# Patient Record
Sex: MALE | Race: WHITE | NOT HISPANIC OR LATINO | Employment: FULL TIME | URBAN - METROPOLITAN AREA
[De-identification: names, ages, dates, MRNs, and addresses within clinical notes are randomized per-mention and may not be internally consistent; named-entity substitution may affect disease eponyms.]

---

## 2022-04-12 ENCOUNTER — HOSPITAL ENCOUNTER (INPATIENT)
Facility: MEDICAL CENTER | Age: 57
LOS: 2 days | DRG: 247 | End: 2022-04-14
Attending: EMERGENCY MEDICINE | Admitting: HOSPITALIST

## 2022-04-12 DIAGNOSIS — I10 HYPERTENSION, UNSPECIFIED TYPE: ICD-10-CM

## 2022-04-12 DIAGNOSIS — I21.19 INFERIOR MYOCARDIAL INFARCTION (HCC): ICD-10-CM

## 2022-04-12 DIAGNOSIS — I21.4 NSTEMI (NON-ST ELEVATED MYOCARDIAL INFARCTION) (HCC): ICD-10-CM

## 2022-04-12 PROBLEM — D72.829 LEUKOCYTOSIS: Status: ACTIVE | Noted: 2022-04-12

## 2022-04-12 PROBLEM — Z72.0 TOBACCO ABUSE: Status: ACTIVE | Noted: 2022-04-12

## 2022-04-12 PROBLEM — F10.10 ALCOHOL ABUSE: Status: ACTIVE | Noted: 2022-04-12

## 2022-04-12 PROBLEM — Z91.199 NON-COMPLIANCE: Status: ACTIVE | Noted: 2022-04-12

## 2022-04-12 PROBLEM — E78.5 DYSLIPIDEMIA: Status: ACTIVE | Noted: 2022-04-12

## 2022-04-12 LAB
ALBUMIN SERPL BCP-MCNC: 3.8 G/DL (ref 3.2–4.9)
ALBUMIN/GLOB SERPL: 1.7 G/DL
ALP SERPL-CCNC: 84 U/L (ref 30–99)
ALT SERPL-CCNC: 27 U/L (ref 2–50)
ANION GAP SERPL CALC-SCNC: 10 MMOL/L (ref 7–16)
APTT PPP: 90.2 SEC (ref 24.7–36)
AST SERPL-CCNC: 44 U/L (ref 12–45)
BASOPHILS # BLD AUTO: 0.2 % (ref 0–1.8)
BASOPHILS # BLD: 0.02 K/UL (ref 0–0.12)
BILIRUB SERPL-MCNC: 0.5 MG/DL (ref 0.1–1.5)
BUN SERPL-MCNC: 12 MG/DL (ref 8–22)
CALCIUM SERPL-MCNC: 7.8 MG/DL (ref 8.5–10.5)
CHLORIDE SERPL-SCNC: 107 MMOL/L (ref 96–112)
CO2 SERPL-SCNC: 22 MMOL/L (ref 20–33)
CREAT SERPL-MCNC: 0.66 MG/DL (ref 0.5–1.4)
EKG IMPRESSION: NORMAL
EOSINOPHIL # BLD AUTO: 0 K/UL (ref 0–0.51)
EOSINOPHIL NFR BLD: 0 % (ref 0–6.9)
ERYTHROCYTE [DISTWIDTH] IN BLOOD BY AUTOMATED COUNT: 38.4 FL (ref 35.9–50)
GFR SERPLBLD CREATININE-BSD FMLA CKD-EPI: 111 ML/MIN/1.73 M 2
GLOBULIN SER CALC-MCNC: 2.3 G/DL (ref 1.9–3.5)
GLUCOSE SERPL-MCNC: 102 MG/DL (ref 65–99)
HCT VFR BLD AUTO: 46 % (ref 42–52)
HGB BLD-MCNC: 16 G/DL (ref 14–18)
IMM GRANULOCYTES # BLD AUTO: 0.06 K/UL (ref 0–0.11)
IMM GRANULOCYTES NFR BLD AUTO: 0.5 % (ref 0–0.9)
INR PPP: 1.05 (ref 0.87–1.13)
LYMPHOCYTES # BLD AUTO: 2.26 K/UL (ref 1–4.8)
LYMPHOCYTES NFR BLD: 19.1 % (ref 22–41)
MCH RBC QN AUTO: 30.1 PG (ref 27–33)
MCHC RBC AUTO-ENTMCNC: 34.8 G/DL (ref 33.7–35.3)
MCV RBC AUTO: 86.6 FL (ref 81.4–97.8)
MONOCYTES # BLD AUTO: 0.46 K/UL (ref 0–0.85)
MONOCYTES NFR BLD AUTO: 3.9 % (ref 0–13.4)
NEUTROPHILS # BLD AUTO: 9.04 K/UL (ref 1.82–7.42)
NEUTROPHILS NFR BLD: 76.3 % (ref 44–72)
NRBC # BLD AUTO: 0 K/UL
NRBC BLD-RTO: 0 /100 WBC
PLATELET # BLD AUTO: 188 K/UL (ref 164–446)
PMV BLD AUTO: 10.8 FL (ref 9–12.9)
POTASSIUM SERPL-SCNC: 4 MMOL/L (ref 3.6–5.5)
PROT SERPL-MCNC: 6.1 G/DL (ref 6–8.2)
PROTHROMBIN TIME: 13.4 SEC (ref 12–14.6)
RBC # BLD AUTO: 5.31 M/UL (ref 4.7–6.1)
SODIUM SERPL-SCNC: 139 MMOL/L (ref 135–145)
TROPONIN T SERPL-MCNC: 100 NG/L (ref 6–19)
UFH PPP CHRO-ACNC: 0.39 IU/ML
WBC # BLD AUTO: 11.8 K/UL (ref 4.8–10.8)

## 2022-04-12 PROCEDURE — 99407 BEHAV CHNG SMOKING > 10 MIN: CPT

## 2022-04-12 PROCEDURE — 99285 EMERGENCY DEPT VISIT HI MDM: CPT

## 2022-04-12 PROCEDURE — 700101 HCHG RX REV CODE 250: Performed by: EMERGENCY MEDICINE

## 2022-04-12 PROCEDURE — 36415 COLL VENOUS BLD VENIPUNCTURE: CPT

## 2022-04-12 PROCEDURE — 96368 THER/DIAG CONCURRENT INF: CPT

## 2022-04-12 PROCEDURE — 96365 THER/PROPH/DIAG IV INF INIT: CPT

## 2022-04-12 PROCEDURE — 700111 HCHG RX REV CODE 636 W/ 250 OVERRIDE (IP): Performed by: INTERNAL MEDICINE

## 2022-04-12 PROCEDURE — 85520 HEPARIN ASSAY: CPT

## 2022-04-12 PROCEDURE — 85025 COMPLETE CBC W/AUTO DIFF WBC: CPT

## 2022-04-12 PROCEDURE — 770020 HCHG ROOM/CARE - TELE (206)

## 2022-04-12 PROCEDURE — 84484 ASSAY OF TROPONIN QUANT: CPT

## 2022-04-12 PROCEDURE — 700102 HCHG RX REV CODE 250 W/ 637 OVERRIDE(OP): Performed by: INTERNAL MEDICINE

## 2022-04-12 PROCEDURE — 96366 THER/PROPH/DIAG IV INF ADDON: CPT

## 2022-04-12 PROCEDURE — 85730 THROMBOPLASTIN TIME PARTIAL: CPT

## 2022-04-12 PROCEDURE — 700111 HCHG RX REV CODE 636 W/ 250 OVERRIDE (IP): Performed by: HOSPITALIST

## 2022-04-12 PROCEDURE — A9270 NON-COVERED ITEM OR SERVICE: HCPCS | Performed by: INTERNAL MEDICINE

## 2022-04-12 PROCEDURE — 85610 PROTHROMBIN TIME: CPT

## 2022-04-12 PROCEDURE — 80053 COMPREHEN METABOLIC PANEL: CPT

## 2022-04-12 PROCEDURE — 93005 ELECTROCARDIOGRAM TRACING: CPT | Performed by: EMERGENCY MEDICINE

## 2022-04-12 PROCEDURE — 99406 BEHAV CHNG SMOKING 3-10 MIN: CPT | Performed by: INTERNAL MEDICINE

## 2022-04-12 PROCEDURE — 96375 TX/PRO/DX INJ NEW DRUG ADDON: CPT

## 2022-04-12 PROCEDURE — 99255 IP/OBS CONSLTJ NEW/EST HI 80: CPT | Mod: 25 | Performed by: INTERNAL MEDICINE

## 2022-04-12 RX ORDER — PROCHLORPERAZINE EDISYLATE 5 MG/ML
5-10 INJECTION INTRAMUSCULAR; INTRAVENOUS EVERY 4 HOURS PRN
Status: DISCONTINUED | OUTPATIENT
Start: 2022-04-12 | End: 2022-04-14 | Stop reason: HOSPADM

## 2022-04-12 RX ORDER — PROMETHAZINE HYDROCHLORIDE 25 MG/1
12.5-25 TABLET ORAL EVERY 4 HOURS PRN
Status: DISCONTINUED | OUTPATIENT
Start: 2022-04-12 | End: 2022-04-14 | Stop reason: HOSPADM

## 2022-04-12 RX ORDER — HEPARIN SODIUM 1000 [USP'U]/ML
2000 INJECTION, SOLUTION INTRAVENOUS; SUBCUTANEOUS PRN
Status: DISCONTINUED | OUTPATIENT
Start: 2022-04-12 | End: 2022-04-13

## 2022-04-12 RX ORDER — ROSUVASTATIN CALCIUM 20 MG/1
20 TABLET, COATED ORAL EVERY EVENING
Status: DISCONTINUED | OUTPATIENT
Start: 2022-04-12 | End: 2022-04-14 | Stop reason: HOSPADM

## 2022-04-12 RX ORDER — NITROGLYCERIN 0.4 MG/1
0.4 TABLET SUBLINGUAL
Status: DISCONTINUED | OUTPATIENT
Start: 2022-04-12 | End: 2022-04-14 | Stop reason: HOSPADM

## 2022-04-12 RX ORDER — HEPARIN SODIUM 1000 [USP'U]/ML
4000 INJECTION, SOLUTION INTRAVENOUS; SUBCUTANEOUS ONCE
Status: DISCONTINUED | OUTPATIENT
Start: 2022-04-12 | End: 2022-04-12

## 2022-04-12 RX ORDER — ASPIRIN 81 MG/1
324 TABLET, CHEWABLE ORAL DAILY
Status: DISCONTINUED | OUTPATIENT
Start: 2022-04-13 | End: 2022-04-13

## 2022-04-12 RX ORDER — POLYETHYLENE GLYCOL 3350 17 G/17G
1 POWDER, FOR SOLUTION ORAL
Status: DISCONTINUED | OUTPATIENT
Start: 2022-04-12 | End: 2022-04-14 | Stop reason: HOSPADM

## 2022-04-12 RX ORDER — HYDRALAZINE HYDROCHLORIDE 20 MG/ML
10 INJECTION INTRAMUSCULAR; INTRAVENOUS EVERY 4 HOURS PRN
Status: DISCONTINUED | OUTPATIENT
Start: 2022-04-12 | End: 2022-04-14 | Stop reason: HOSPADM

## 2022-04-12 RX ORDER — SODIUM CHLORIDE, SODIUM LACTATE, POTASSIUM CHLORIDE, CALCIUM CHLORIDE 600; 310; 30; 20 MG/100ML; MG/100ML; MG/100ML; MG/100ML
INJECTION, SOLUTION INTRAVENOUS CONTINUOUS
Status: DISCONTINUED | OUTPATIENT
Start: 2022-04-12 | End: 2022-04-14

## 2022-04-12 RX ORDER — CARVEDILOL 6.25 MG/1
6.25 TABLET ORAL 2 TIMES DAILY WITH MEALS
Status: DISCONTINUED | OUTPATIENT
Start: 2022-04-12 | End: 2022-04-12

## 2022-04-12 RX ORDER — HEPARIN SODIUM 5000 [USP'U]/100ML
0-30 INJECTION, SOLUTION INTRAVENOUS CONTINUOUS
Status: DISCONTINUED | OUTPATIENT
Start: 2022-04-12 | End: 2022-04-13

## 2022-04-12 RX ORDER — AMOXICILLIN 250 MG
2 CAPSULE ORAL 2 TIMES DAILY
Status: DISCONTINUED | OUTPATIENT
Start: 2022-04-13 | End: 2022-04-14 | Stop reason: HOSPADM

## 2022-04-12 RX ORDER — PROMETHAZINE HYDROCHLORIDE 25 MG/1
12.5-25 SUPPOSITORY RECTAL EVERY 4 HOURS PRN
Status: DISCONTINUED | OUTPATIENT
Start: 2022-04-12 | End: 2022-04-14 | Stop reason: HOSPADM

## 2022-04-12 RX ORDER — ATORVASTATIN CALCIUM 20 MG/1
40 TABLET, FILM COATED ORAL EVERY EVENING
Status: DISCONTINUED | OUTPATIENT
Start: 2022-04-12 | End: 2022-04-12

## 2022-04-12 RX ORDER — ASPIRIN 300 MG/1
300 SUPPOSITORY RECTAL DAILY
Status: DISCONTINUED | OUTPATIENT
Start: 2022-04-13 | End: 2022-04-13

## 2022-04-12 RX ORDER — BISACODYL 10 MG
10 SUPPOSITORY, RECTAL RECTAL
Status: DISCONTINUED | OUTPATIENT
Start: 2022-04-12 | End: 2022-04-14 | Stop reason: HOSPADM

## 2022-04-12 RX ORDER — LOSARTAN POTASSIUM 25 MG/1
25 TABLET ORAL EVERY EVENING
Status: DISCONTINUED | OUTPATIENT
Start: 2022-04-12 | End: 2022-04-14 | Stop reason: HOSPADM

## 2022-04-12 RX ORDER — ONDANSETRON 4 MG/1
4 TABLET, ORALLY DISINTEGRATING ORAL EVERY 4 HOURS PRN
Status: DISCONTINUED | OUTPATIENT
Start: 2022-04-12 | End: 2022-04-14 | Stop reason: HOSPADM

## 2022-04-12 RX ORDER — MORPHINE SULFATE 4 MG/ML
4 INJECTION INTRAVENOUS ONCE
Status: DISCONTINUED | OUTPATIENT
Start: 2022-04-12 | End: 2022-04-13

## 2022-04-12 RX ORDER — ONDANSETRON 2 MG/ML
4 INJECTION INTRAMUSCULAR; INTRAVENOUS ONCE
Status: DISCONTINUED | OUTPATIENT
Start: 2022-04-12 | End: 2022-04-13

## 2022-04-12 RX ORDER — ONDANSETRON 2 MG/ML
4 INJECTION INTRAMUSCULAR; INTRAVENOUS EVERY 4 HOURS PRN
Status: DISCONTINUED | OUTPATIENT
Start: 2022-04-12 | End: 2022-04-14 | Stop reason: HOSPADM

## 2022-04-12 RX ORDER — LABETALOL HYDROCHLORIDE 5 MG/ML
10 INJECTION, SOLUTION INTRAVENOUS ONCE
Status: COMPLETED | OUTPATIENT
Start: 2022-04-12 | End: 2022-04-12

## 2022-04-12 RX ORDER — LISINOPRIL 10 MG/1
5 TABLET ORAL DAILY
Status: DISCONTINUED | OUTPATIENT
Start: 2022-04-13 | End: 2022-04-12

## 2022-04-12 RX ORDER — ACETAMINOPHEN 325 MG/1
650 TABLET ORAL EVERY 6 HOURS PRN
Status: DISCONTINUED | OUTPATIENT
Start: 2022-04-12 | End: 2022-04-14 | Stop reason: HOSPADM

## 2022-04-12 RX ORDER — ASPIRIN 325 MG
325 TABLET ORAL DAILY
Status: DISCONTINUED | OUTPATIENT
Start: 2022-04-13 | End: 2022-04-13

## 2022-04-12 RX ORDER — NITROGLYCERIN 20 MG/100ML
20 INJECTION INTRAVENOUS CONTINUOUS
Status: DISCONTINUED | OUTPATIENT
Start: 2022-04-12 | End: 2022-04-14

## 2022-04-12 RX ORDER — IBUPROFEN 600 MG/1
600 TABLET ORAL PRN
Status: ON HOLD | COMMUNITY
End: 2022-04-14

## 2022-04-12 RX ADMIN — LABETALOL HYDROCHLORIDE 10 MG: 5 INJECTION INTRAVENOUS at 18:26

## 2022-04-12 RX ADMIN — HEPARIN SODIUM 12 UNITS/KG/HR: 5000 INJECTION, SOLUTION INTRAVENOUS at 19:17

## 2022-04-12 RX ADMIN — LOSARTAN POTASSIUM 25 MG: 25 TABLET, FILM COATED ORAL at 19:09

## 2022-04-12 RX ADMIN — ROSUVASTATIN CALCIUM 20 MG: 20 TABLET, FILM COATED ORAL at 19:09

## 2022-04-12 RX ADMIN — ONDANSETRON 4 MG: 2 INJECTION INTRAMUSCULAR; INTRAVENOUS at 20:21

## 2022-04-12 RX ADMIN — NITROGLYCERIN 20 MCG/MIN: 20 INJECTION INTRAVENOUS at 19:13

## 2022-04-12 ASSESSMENT — ENCOUNTER SYMPTOMS
DIZZINESS: 0
MUSCULOSKELETAL NEGATIVE: 1
CONSTIPATION: 0
HEARTBURN: 0
COUGH: 0
DIARRHEA: 0
FOCAL WEAKNESS: 0
NAUSEA: 1
PALPITATIONS: 0
ABDOMINAL PAIN: 0
VOMITING: 0
BLOOD IN STOOL: 0
DOUBLE VISION: 0
CHILLS: 0
BRUISES/BLEEDS EASILY: 0
LOSS OF CONSCIOUSNESS: 0
HEMOPTYSIS: 0
DIAPHORESIS: 1
WHEEZING: 0
SHORTNESS OF BREATH: 1
NERVOUS/ANXIOUS: 0
NEUROLOGICAL NEGATIVE: 1
EYES NEGATIVE: 1
SEIZURES: 0
FEVER: 0
ORTHOPNEA: 1
HEADACHES: 0

## 2022-04-12 ASSESSMENT — LIFESTYLE VARIABLES: SUBSTANCE_ABUSE: 1

## 2022-04-13 ENCOUNTER — APPOINTMENT (OUTPATIENT)
Dept: CARDIOLOGY | Facility: MEDICAL CENTER | Age: 57
DRG: 247 | End: 2022-04-13
Attending: INTERNAL MEDICINE

## 2022-04-13 LAB
ANION GAP SERPL CALC-SCNC: 11 MMOL/L (ref 7–16)
BUN SERPL-MCNC: 14 MG/DL (ref 8–22)
CALCIUM SERPL-MCNC: 9 MG/DL (ref 8.4–10.2)
CHLORIDE SERPL-SCNC: 102 MMOL/L (ref 96–112)
CHOLEST SERPL-MCNC: 164 MG/DL (ref 100–199)
CO2 SERPL-SCNC: 20 MMOL/L (ref 20–33)
CREAT SERPL-MCNC: 0.67 MG/DL (ref 0.5–1.4)
EKG IMPRESSION: NORMAL
ERYTHROCYTE [DISTWIDTH] IN BLOOD BY AUTOMATED COUNT: 38.2 FL (ref 35.9–50)
GFR SERPLBLD CREATININE-BSD FMLA CKD-EPI: 110 ML/MIN/1.73 M 2
GLUCOSE SERPL-MCNC: 113 MG/DL (ref 65–99)
HCT VFR BLD AUTO: 45.6 % (ref 42–52)
HDLC SERPL-MCNC: 39 MG/DL
HGB BLD-MCNC: 15.9 G/DL (ref 14–18)
LDLC SERPL CALC-MCNC: 90 MG/DL
LV EJECT FRACT  99904: 55
LV EJECT FRACT MOD 2C 99903: 55.82
LV EJECT FRACT MOD 4C 99902: 57.24
LV EJECT FRACT MOD BP 99901: 56.11
MAGNESIUM SERPL-MCNC: 2 MG/DL (ref 1.5–2.5)
MCH RBC QN AUTO: 30 PG (ref 27–33)
MCHC RBC AUTO-ENTMCNC: 34.9 G/DL (ref 33.7–35.3)
MCV RBC AUTO: 86 FL (ref 81.4–97.8)
PLATELET # BLD AUTO: 195 K/UL (ref 164–446)
PMV BLD AUTO: 10.7 FL (ref 9–12.9)
POTASSIUM SERPL-SCNC: 4.5 MMOL/L (ref 3.6–5.5)
RBC # BLD AUTO: 5.3 M/UL (ref 4.7–6.1)
SODIUM SERPL-SCNC: 133 MMOL/L (ref 135–145)
TRIGL SERPL-MCNC: 175 MG/DL (ref 0–149)
TROPONIN T SERPL-MCNC: 425 NG/L (ref 6–19)
TROPONIN T SERPL-MCNC: 902 NG/L (ref 6–19)
TSH SERPL DL<=0.005 MIU/L-ACNC: 1.09 UIU/ML (ref 0.38–5.33)
UFH PPP CHRO-ACNC: 0.15 IU/ML
UFH PPP CHRO-ACNC: 0.41 IU/ML
UFH PPP CHRO-ACNC: 0.7 IU/ML
WBC # BLD AUTO: 12 K/UL (ref 4.8–10.8)

## 2022-04-13 PROCEDURE — 770020 HCHG ROOM/CARE - TELE (206)

## 2022-04-13 PROCEDURE — A9270 NON-COVERED ITEM OR SERVICE: HCPCS

## 2022-04-13 PROCEDURE — 4A023N7 MEASUREMENT OF CARDIAC SAMPLING AND PRESSURE, LEFT HEART, PERCUTANEOUS APPROACH: ICD-10-PCS | Performed by: INTERNAL MEDICINE

## 2022-04-13 PROCEDURE — 027035Z DILATION OF CORONARY ARTERY, ONE ARTERY WITH TWO DRUG-ELUTING INTRALUMINAL DEVICES, PERCUTANEOUS APPROACH: ICD-10-PCS | Performed by: INTERNAL MEDICINE

## 2022-04-13 PROCEDURE — 700111 HCHG RX REV CODE 636 W/ 250 OVERRIDE (IP): Performed by: INTERNAL MEDICINE

## 2022-04-13 PROCEDURE — 84484 ASSAY OF TROPONIN QUANT: CPT

## 2022-04-13 PROCEDURE — 93010 ELECTROCARDIOGRAM REPORT: CPT | Performed by: INTERNAL MEDICINE

## 2022-04-13 PROCEDURE — 85520 HEPARIN ASSAY: CPT | Mod: 91

## 2022-04-13 PROCEDURE — 80061 LIPID PANEL: CPT

## 2022-04-13 PROCEDURE — B2111ZZ FLUOROSCOPY OF MULTIPLE CORONARY ARTERIES USING LOW OSMOLAR CONTRAST: ICD-10-PCS | Performed by: INTERNAL MEDICINE

## 2022-04-13 PROCEDURE — B240ZZ3 ULTRASONOGRAPHY OF SINGLE CORONARY ARTERY, INTRAVASCULAR: ICD-10-PCS | Performed by: INTERNAL MEDICINE

## 2022-04-13 PROCEDURE — 99406 BEHAV CHNG SMOKING 3-10 MIN: CPT | Performed by: INTERNAL MEDICINE

## 2022-04-13 PROCEDURE — 93306 TTE W/DOPPLER COMPLETE: CPT

## 2022-04-13 PROCEDURE — 99233 SBSQ HOSP IP/OBS HIGH 50: CPT | Mod: GC | Performed by: HOSPITALIST

## 2022-04-13 PROCEDURE — 80048 BASIC METABOLIC PNL TOTAL CA: CPT

## 2022-04-13 PROCEDURE — 96366 THER/PROPH/DIAG IV INF ADDON: CPT

## 2022-04-13 PROCEDURE — 99152 MOD SED SAME PHYS/QHP 5/>YRS: CPT | Performed by: INTERNAL MEDICINE

## 2022-04-13 PROCEDURE — 700102 HCHG RX REV CODE 250 W/ 637 OVERRIDE(OP): Performed by: HOSPITALIST

## 2022-04-13 PROCEDURE — 93005 ELECTROCARDIOGRAM TRACING: CPT | Performed by: HOSPITALIST

## 2022-04-13 PROCEDURE — 84443 ASSAY THYROID STIM HORMONE: CPT

## 2022-04-13 PROCEDURE — 92928 PRQ TCAT PLMT NTRAC ST 1 LES: CPT | Mod: RC | Performed by: INTERNAL MEDICINE

## 2022-04-13 PROCEDURE — 36415 COLL VENOUS BLD VENIPUNCTURE: CPT

## 2022-04-13 PROCEDURE — 93005 ELECTROCARDIOGRAM TRACING: CPT | Performed by: INTERNAL MEDICINE

## 2022-04-13 PROCEDURE — 700111 HCHG RX REV CODE 636 W/ 250 OVERRIDE (IP)

## 2022-04-13 PROCEDURE — A9270 NON-COVERED ITEM OR SERVICE: HCPCS | Performed by: HOSPITALIST

## 2022-04-13 PROCEDURE — 83735 ASSAY OF MAGNESIUM: CPT

## 2022-04-13 PROCEDURE — 93458 L HRT ARTERY/VENTRICLE ANGIO: CPT | Mod: 26,59 | Performed by: INTERNAL MEDICINE

## 2022-04-13 PROCEDURE — 93306 TTE W/DOPPLER COMPLETE: CPT | Mod: 26 | Performed by: INTERNAL MEDICINE

## 2022-04-13 PROCEDURE — 85027 COMPLETE CBC AUTOMATED: CPT

## 2022-04-13 PROCEDURE — 85347 COAGULATION TIME ACTIVATED: CPT | Mod: 91

## 2022-04-13 PROCEDURE — 700102 HCHG RX REV CODE 250 W/ 637 OVERRIDE(OP)

## 2022-04-13 PROCEDURE — 99232 SBSQ HOSP IP/OBS MODERATE 35: CPT | Mod: 25,FS | Performed by: INTERNAL MEDICINE

## 2022-04-13 PROCEDURE — 700105 HCHG RX REV CODE 258: Performed by: HOSPITALIST

## 2022-04-13 PROCEDURE — C1769 GUIDE WIRE: HCPCS

## 2022-04-13 PROCEDURE — 700101 HCHG RX REV CODE 250

## 2022-04-13 PROCEDURE — 92978 ENDOLUMINL IVUS OCT C 1ST: CPT | Mod: 26,RC | Performed by: INTERNAL MEDICINE

## 2022-04-13 PROCEDURE — 93010 ELECTROCARDIOGRAM REPORT: CPT | Mod: 77 | Performed by: INTERNAL MEDICINE

## 2022-04-13 RX ORDER — EPTIFIBATIDE 0.75 MG/ML
2 INJECTION, SOLUTION INTRAVENOUS CONTINUOUS
Status: ACTIVE | OUTPATIENT
Start: 2022-04-13 | End: 2022-04-14

## 2022-04-13 RX ORDER — PRASUGREL 10 MG/1
60 TABLET, FILM COATED ORAL ONCE
Status: DISCONTINUED | OUTPATIENT
Start: 2022-04-13 | End: 2022-04-13

## 2022-04-13 RX ORDER — PRASUGREL 10 MG/1
TABLET, FILM COATED ORAL
Status: COMPLETED
Start: 2022-04-13 | End: 2022-04-13

## 2022-04-13 RX ORDER — EPTIFIBATIDE 2 MG/ML
INJECTION, SOLUTION INTRAVENOUS
Status: COMPLETED
Start: 2022-04-13 | End: 2022-04-13

## 2022-04-13 RX ORDER — VERAPAMIL HYDROCHLORIDE 2.5 MG/ML
INJECTION, SOLUTION INTRAVENOUS
Status: COMPLETED
Start: 2022-04-13 | End: 2022-04-13

## 2022-04-13 RX ORDER — PRASUGREL 10 MG/1
10 TABLET, FILM COATED ORAL DAILY
Status: DISCONTINUED | OUTPATIENT
Start: 2022-04-14 | End: 2022-04-14 | Stop reason: HOSPADM

## 2022-04-13 RX ORDER — CHOLECALCIFEROL (VITAMIN D3) 125 MCG
5 CAPSULE ORAL NIGHTLY
Status: DISCONTINUED | OUTPATIENT
Start: 2022-04-13 | End: 2022-04-14 | Stop reason: HOSPADM

## 2022-04-13 RX ORDER — EPTIFIBATIDE 0.75 MG/ML
INJECTION, SOLUTION INTRAVENOUS
Status: COMPLETED
Start: 2022-04-13 | End: 2022-04-14

## 2022-04-13 RX ORDER — HEPARIN SODIUM 200 [USP'U]/100ML
INJECTION, SOLUTION INTRAVENOUS
Status: COMPLETED
Start: 2022-04-13 | End: 2022-04-13

## 2022-04-13 RX ORDER — LIDOCAINE HYDROCHLORIDE 20 MG/ML
INJECTION, SOLUTION INFILTRATION; PERINEURAL
Status: COMPLETED
Start: 2022-04-13 | End: 2022-04-13

## 2022-04-13 RX ORDER — HEPARIN SODIUM 1000 [USP'U]/ML
INJECTION, SOLUTION INTRAVENOUS; SUBCUTANEOUS
Status: COMPLETED
Start: 2022-04-13 | End: 2022-04-13

## 2022-04-13 RX ORDER — MIDAZOLAM HYDROCHLORIDE 1 MG/ML
INJECTION INTRAMUSCULAR; INTRAVENOUS
Status: COMPLETED
Start: 2022-04-13 | End: 2022-04-13

## 2022-04-13 RX ADMIN — SODIUM CHLORIDE, POTASSIUM CHLORIDE, SODIUM LACTATE AND CALCIUM CHLORIDE: 600; 310; 30; 20 INJECTION, SOLUTION INTRAVENOUS at 02:15

## 2022-04-13 RX ADMIN — PRASUGREL 60 MG: 10 TABLET, FILM COATED ORAL at 19:32

## 2022-04-13 RX ADMIN — MIDAZOLAM HYDROCHLORIDE 2 MG: 1 INJECTION, SOLUTION INTRAMUSCULAR; INTRAVENOUS at 19:24

## 2022-04-13 RX ADMIN — ACETAMINOPHEN 650 MG: 325 TABLET, FILM COATED ORAL at 04:43

## 2022-04-13 RX ADMIN — EPTIFIBATIDE 14.4 MG: 20 INJECTION INTRAVENOUS at 19:20

## 2022-04-13 RX ADMIN — Medication 5 MG: at 01:45

## 2022-04-13 RX ADMIN — HEPARIN SODIUM: 1000 INJECTION, SOLUTION INTRAVENOUS; SUBCUTANEOUS at 18:49

## 2022-04-13 RX ADMIN — Medication 5 MG: at 21:54

## 2022-04-13 RX ADMIN — ASPIRIN 325 MG: 325 TABLET ORAL at 05:54

## 2022-04-13 RX ADMIN — ACETAMINOPHEN 650 MG: 325 TABLET, FILM COATED ORAL at 21:54

## 2022-04-13 RX ADMIN — NITROGLYCERIN 10 ML: 20 INJECTION INTRAVENOUS at 18:51

## 2022-04-13 RX ADMIN — NITROGLYCERIN 0.4 MG: 0.4 TABLET, ORALLY DISINTEGRATING SUBLINGUAL at 04:43

## 2022-04-13 RX ADMIN — EPTIFIBATIDE 14.4 MG: 20 INJECTION INTRAVENOUS at 19:33

## 2022-04-13 RX ADMIN — HEPARIN SODIUM 2000 UNITS: 200 INJECTION, SOLUTION INTRAVENOUS at 18:48

## 2022-04-13 RX ADMIN — SODIUM CHLORIDE, POTASSIUM CHLORIDE, SODIUM LACTATE AND CALCIUM CHLORIDE: 600; 310; 30; 20 INJECTION, SOLUTION INTRAVENOUS at 15:08

## 2022-04-13 RX ADMIN — HEPARIN SODIUM 2000 UNITS: 1000 INJECTION, SOLUTION INTRAVENOUS; SUBCUTANEOUS at 08:31

## 2022-04-13 RX ADMIN — EPTIFIBATIDE 2 MCG/KG/MIN: 0.75 INJECTION, SOLUTION INTRAVENOUS at 19:25

## 2022-04-13 RX ADMIN — NITROGLYCERIN 0.4 MG: 0.4 TABLET, ORALLY DISINTEGRATING SUBLINGUAL at 16:52

## 2022-04-13 RX ADMIN — NITROGLYCERIN 0.4 MG: 0.4 TABLET, ORALLY DISINTEGRATING SUBLINGUAL at 17:03

## 2022-04-13 RX ADMIN — EPTIFIBATIDE 2 MCG/KG/MIN: 0.75 INJECTION INTRAVENOUS at 19:26

## 2022-04-13 RX ADMIN — FENTANYL CITRATE 75 MCG: 50 INJECTION, SOLUTION INTRAMUSCULAR; INTRAVENOUS at 19:24

## 2022-04-13 RX ADMIN — VERAPAMIL HYDROCHLORIDE 2.5 MG: 2.5 INJECTION, SOLUTION INTRAVENOUS at 18:49

## 2022-04-13 RX ADMIN — LIDOCAINE HYDROCHLORIDE: 20 INJECTION, SOLUTION INFILTRATION; PERINEURAL at 18:49

## 2022-04-13 ASSESSMENT — PATIENT HEALTH QUESTIONNAIRE - PHQ9
SUM OF ALL RESPONSES TO PHQ9 QUESTIONS 1 AND 2: 1
9. THOUGHTS THAT YOU WOULD BE BETTER OFF DEAD, OR OF HURTING YOURSELF: NOT AT ALL
5. POOR APPETITE OR OVEREATING: NOT AT ALL
8. MOVING OR SPEAKING SO SLOWLY THAT OTHER PEOPLE COULD HAVE NOTICED. OR THE OPPOSITE, BEING SO FIGETY OR RESTLESS THAT YOU HAVE BEEN MOVING AROUND A LOT MORE THAN USUAL: SEVERAL DAYS
6. FEELING BAD ABOUT YOURSELF - OR THAT YOU ARE A FAILURE OR HAVE LET YOURSELF OR YOUR FAMILY DOWN: NOT AL ALL
7. TROUBLE CONCENTRATING ON THINGS, SUCH AS READING THE NEWSPAPER OR WATCHING TELEVISION: NOT AT ALL
3. TROUBLE FALLING OR STAYING ASLEEP OR SLEEPING TOO MUCH: SEVERAL DAYS
1. LITTLE INTEREST OR PLEASURE IN DOING THINGS: NOT AT ALL
4. FEELING TIRED OR HAVING LITTLE ENERGY: SEVERAL DAYS
SUM OF ALL RESPONSES TO PHQ QUESTIONS 1-9: 4
2. FEELING DOWN, DEPRESSED, IRRITABLE, OR HOPELESS: SEVERAL DAYS

## 2022-04-13 ASSESSMENT — ENCOUNTER SYMPTOMS
WEIGHT LOSS: 0
WHEEZING: 0
EYE PAIN: 0
EYE REDNESS: 0
EYE DISCHARGE: 0
AGITATION: 0
COUGH: 0
SPUTUM PRODUCTION: 0
VOMITING: 0
SORE THROAT: 0
DIZZINESS: 0
STRIDOR: 0
ABDOMINAL DISTENTION: 0
WEAKNESS: 0
CHILLS: 0
CONSTIPATION: 0
SINUS PAIN: 0
TROUBLE SWALLOWING: 0
DIAPHORESIS: 0
SHORTNESS OF BREATH: 0
NUMBNESS: 0
ABDOMINAL PAIN: 0
FOCAL WEAKNESS: 0
COLOR CHANGE: 0
NAUSEA: 0
CHEST TIGHTNESS: 1
PND: 0
BLOOD IN STOOL: 0
CONFUSION: 0
ORTHOPNEA: 0
PALPITATIONS: 0
FEVER: 0
NERVOUS/ANXIOUS: 0
SPEECH CHANGE: 0
HEMOPTYSIS: 0

## 2022-04-13 ASSESSMENT — LIFESTYLE VARIABLES
HOW MANY TIMES IN THE PAST YEAR HAVE YOU HAD 5 OR MORE DRINKS IN A DAY: 20
EVER HAD A DRINK FIRST THING IN THE MORNING TO STEADY YOUR NERVES TO GET RID OF A HANGOVER: NO
TOTAL SCORE: 2
HAVE PEOPLE ANNOYED YOU BY CRITICIZING YOUR DRINKING: NO
AVERAGE NUMBER OF DAYS PER WEEK YOU HAVE A DRINK CONTAINING ALCOHOL: 3
TOTAL SCORE: 2
HAVE YOU EVER FELT YOU SHOULD CUT DOWN ON YOUR DRINKING: YES
DOES PATIENT WANT TO STOP DRINKING: YES
CONSUMPTION TOTAL: POSITIVE
EVER FELT BAD OR GUILTY ABOUT YOUR DRINKING: YES
ALCOHOL_USE: YES
TOTAL SCORE: 2
DOES PATIENT WANT TO TALK TO SOMEONE ABOUT QUITTING: NO
ON A TYPICAL DAY WHEN YOU DRINK ALCOHOL HOW MANY DRINKS DO YOU HAVE: 2

## 2022-04-13 ASSESSMENT — COGNITIVE AND FUNCTIONAL STATUS - GENERAL
SUGGESTED CMS G CODE MODIFIER DAILY ACTIVITY: CH
MOBILITY SCORE: 24
SUGGESTED CMS G CODE MODIFIER MOBILITY: CH
DAILY ACTIVITIY SCORE: 24

## 2022-04-13 ASSESSMENT — PAIN DESCRIPTION - PAIN TYPE
TYPE: ACUTE PAIN

## 2022-04-13 ASSESSMENT — FIBROSIS 4 INDEX: FIB4 SCORE: 2.43

## 2022-04-13 NOTE — DISCHARGE PLANNING
Anticipated Discharge Disposition: home w/close outpt follow-up     Action: pt pending medical clearance, pt currently on 0 liters O2, 6 clicks are 24/24. Orientation: A&O 4.      Barriers to Discharge: medical clearance. No insurance listed at the moment. Pt lives in [place], [place]    Plan: f/u with pt and medical team to discuss dc needs and barriers.

## 2022-04-13 NOTE — ED NOTES
"Pt provided container for his teeth, labeled with pt sticker. Pt requesting lights be turned down do he can rest. Current pain level is 2/10 and \"tolerable\".   "

## 2022-04-13 NOTE — ASSESSMENT & PLAN NOTE
Likely NSTEMI due to elevated troponin and no ST elevation in EKG.  Patient started having chest pain after running after his dog on the street.  Patient has been noncompliant with medications since 2015 after he had stent placement.  He only took medication for 6 months at that time.  -Telemetry  -Continue aspirin  -Continue Metroprolol tartrate 25 mg twice daily  -Continue Heparin therapeutic dose  -Continue nitroglycerin  -Follow-up with cardiology on cardiac catheterization results.

## 2022-04-13 NOTE — PROGRESS NOTES
0300 MD notified of increased chest pain to 3/10. Per MD continue with nitro drip and do not give PRN sublingual nitro. MD also updated of sys 115 which is approaching the current parameters of the drip going. Will continue to monitor.      0400 Pt sys dropped below 115. Clarified order with MD. Per MD continue with the nitro drip. Pt reporting 3/10 pain still but wincing and reporting that he feels like his chest is about to burst. Per MD gave sublingual nitro tab.

## 2022-04-13 NOTE — ASSESSMENT & PLAN NOTE
History of tobacco use disorder.  Smokes 1 pack per 2 days.  -Advised patient on quitting smoking due to his coronary artery disease.

## 2022-04-13 NOTE — ASSESSMENT & PLAN NOTE
History of alcohol use disorder.  Alcohol: 2 beers per 3 days  -Advised patient to quit drinking due to his high risk for coronary artery disease.

## 2022-04-13 NOTE — ASSESSMENT & PLAN NOTE
Likely reactive leukocytosis due to pain, versus low suspicion of infection.  -Continue to monitor CBCs

## 2022-04-13 NOTE — H&P
Hospital Medicine History & Physical Note    Date of Service  4/12/2022    Primary Care Physician  Pcp Pt States None    Consultants  cardiology    Specialist Names: Dr. Bell    Code Status  Full Code    Chief Complaint  Chief Complaint   Patient presents with   • Chest Pain       History of Presenting Illness  Joe Corrales is a 56 y.o. male who presented 4/12/2022 with ongoing chest pain that began around 11 AM today.  Patient has a past medical history of coronary artery disease with myocardial infarction 7 years ago.  He took his medications after having a stent placed 7 years ago for about 6 months and then stopped as he did not feel good with them.  He has no other explanation for being noncompliant.  Patient been ongoing with tobacco abuse and alcohol abuse and marijuana use has now developed worsening chest pain and this is accompanied by diaphoresis as well as shortness of breath and radiation to the left arm.  The patient's initial pain was 5-6 out of 10 intensity and then slowly intensified and so he went to the local emergency room in [place].  From [place] he was life flighted to [facility] for evaluation.  The evaluation at this point shows a non-ST elevation myocardial infarction and thus cardiology at this point plans for cardiac catheterization tomorrow morning..    I discussed the plan of care with patient, bedside RN, pharmacy and Emergency room physician and cardiology.    Review of Systems  Review of Systems   Constitutional: Positive for diaphoresis and malaise/fatigue. Negative for chills and fever.   HENT: Negative.    Eyes: Negative.  Negative for double vision.   Respiratory: Positive for shortness of breath. Negative for cough, hemoptysis and wheezing.    Cardiovascular: Positive for chest pain and orthopnea. Negative for palpitations and leg swelling.   Gastrointestinal: Positive for nausea. Negative for abdominal pain, blood in stool, constipation, diarrhea, heartburn and vomiting.    Genitourinary: Negative.  Negative for frequency, hematuria and urgency.   Musculoskeletal: Negative.  Negative for joint pain.   Skin: Negative.  Negative for itching and rash.   Neurological: Negative.  Negative for dizziness, focal weakness, seizures, loss of consciousness and headaches.   Endo/Heme/Allergies: Negative.  Does not bruise/bleed easily.   Psychiatric/Behavioral: Positive for substance abuse (Tobacco, alcohol, marijuana). Negative for suicidal ideas. The patient is not nervous/anxious.    All other systems reviewed and are negative.      Past Medical History   has no past medical history on file.    Surgical History   has no past surgical history on file.     Family History  family history is not on file.   Family history reviewed with patient. There is family history that is pertinent to the chief complaint.     Social History   reports that he has been smoking cigarettes. He has been smoking about 0.50 packs per day. He has never used smokeless tobacco. He reports current alcohol use of about 1.2 oz of alcohol per week. He reports current drug use. Drug: Inhaled.    Allergies  No Known Allergies    Medications  Prior to Admission Medications   Prescriptions Last Dose Informant Patient Reported? Taking?   aspirin (ASA) 81 MG CHEW chewable tablet   No No   Sig: Take 1 Tab by mouth every day.   atorvastatin (LIPITOR) 40 MG TABS   No No   Sig: Take 1 Tab by mouth every evening.   lisinopril (PRINIVIL) 5 MG TABS   No No   Sig: Take 1 Tab by mouth every day.   metoprolol (LOPRESSOR) 25 MG TABS   No No   Sig: Take 1 Tab by mouth 2 Times a Day.   prasugrel (EFFIENT) 10 MG Tab   No No   Sig: Take 1 Tab by mouth every day.      Facility-Administered Medications: None       Physical Exam  Temp:  [36.5 °C (97.7 °F)] 36.5 °C (97.7 °F)  Pulse:  [50-59] 54  Resp:  [16-22] 19  BP: (150-178)/() 150/97  SpO2:  [94 %-97 %] 96 %  Blood Pressure: 150/97   Temperature: 36.5 °C (97.7 °F)   Pulse: (!) 54    Respiration: 19   Pulse Oximetry: 96 %       Physical Exam  Vitals and nursing note reviewed. Exam conducted with a chaperone present.   Constitutional:       General: He is not in acute distress.     Appearance: Normal appearance. He is well-developed and normal weight. He is not ill-appearing, toxic-appearing or diaphoretic.   HENT:      Head: Normocephalic and atraumatic.      Right Ear: External ear normal.      Left Ear: External ear normal.      Nose: Nose normal.      Mouth/Throat:      Mouth: Mucous membranes are moist.      Pharynx: Oropharynx is clear.   Eyes:      Extraocular Movements: Extraocular movements intact.      Conjunctiva/sclera: Conjunctivae normal.      Pupils: Pupils are equal, round, and reactive to light.   Neck:      Thyroid: No thyromegaly.      Vascular: No JVD.   Cardiovascular:      Rate and Rhythm: Normal rate and regular rhythm.      Pulses: Normal pulses.      Heart sounds: Normal heart sounds.   Pulmonary:      Effort: Pulmonary effort is normal.      Breath sounds: Normal breath sounds.   Chest:      Chest wall: No tenderness.   Abdominal:      General: Abdomen is flat. Bowel sounds are normal. There is no distension.      Palpations: Abdomen is soft. There is no mass.      Tenderness: There is no abdominal tenderness. There is no guarding or rebound.   Musculoskeletal:         General: Normal range of motion.      Cervical back: Normal range of motion and neck supple.      Right lower leg: No edema.   Lymphadenopathy:      Cervical: No cervical adenopathy.   Skin:     General: Skin is warm and dry.      Capillary Refill: Capillary refill takes less than 2 seconds.      Findings: No rash.   Neurological:      General: No focal deficit present.      Mental Status: He is alert and oriented to person, place, and time. Mental status is at baseline.      GCS: GCS eye subscore is 4. GCS verbal subscore is 5. GCS motor subscore is 6.      Cranial Nerves: No cranial nerve deficit.       Motor: No weakness.      Gait: Gait normal.      Deep Tendon Reflexes: Reflexes are normal and symmetric.   Psychiatric:         Mood and Affect: Mood normal.         Behavior: Behavior normal.         Thought Content: Thought content normal.         Judgment: Judgment normal.         Laboratory:  Recent Labs     04/12/22  1734   WBC 11.8*   RBC 5.31   HEMOGLOBIN 16.0   HEMATOCRIT 46.0   MCV 86.6   MCH 30.1   MCHC 34.8   RDW 38.4   PLATELETCT 188   MPV 10.8     Recent Labs     04/12/22  1734   SODIUM 139   POTASSIUM 4.0   CHLORIDE 107   CO2 22   GLUCOSE 102*   BUN 12   CREATININE 0.66   CALCIUM 7.8*     Recent Labs     04/12/22  1734   ALTSGPT 27   ASTSGOT 44   ALKPHOSPHAT 84   TBILIRUBIN 0.5   GLUCOSE 102*     Recent Labs     04/12/22  1734   APTT 90.2*   INR 1.05     No results for input(s): NTPROBNP in the last 72 hours.      Recent Labs     04/12/22  1734   TROPONINT 100*       Imaging:  EC-ECHOCARDIOGRAM COMPLETE W/O CONT    (Results Pending)       X-Ray:  I have personally reviewed the images and compared with prior images.  EKG:  I have personally reviewed the images and compared with prior images.    Assessment/Plan:  I anticipate this patient will require at least two midnights for appropriate medical management, necessitating inpatient admission.    * NSTEMI (non-ST elevated myocardial infarction) (HCC)- (present on admission)  Assessment & Plan  Patient states that suddenly around 11 AM today he developed sudden onset of chest pressure which became 5 out of 6 in intensity.  He says it felt like somebody hit him with a baseball bat.  Became diaphoretic short of breath and it radiated into the left shoulder and down the left arm.  He also had diaphoresis  He also had nausea but no vomiting  Went to the local emergency room and [place].  He was identified to have a non-ST elevation myocardial infarction was life flighted to [facility] for additional treatment  Cardiology has seen the patient and they recommend at  this point cardiac catheterization in the morning and stabilization overnight with medication optimization.    Leukocytosis  Assessment & Plan  Mild leukocytosis which is most likely stress response continue to monitor    Non-compliance  Assessment & Plan  Discussed the necessity for medications after his myocardial infarction.  Patient had a myocardial infarction 7 years ago and has been noncompliant with his medications.    Alcohol abuse  Assessment & Plan  Alcohol cessation counseling provided    Tobacco abuse  Assessment & Plan  -nicotine replacement protocol and cessation education provided for 12 minutes, discussed options of nicotine patch, acupuncture, medical treatment with wellbutrin and chantix. Discussed other options. Code 71141        VTE prophylaxis: therapeutic anticoagulation with Heparin infusion

## 2022-04-13 NOTE — CONSULTS
Reason for Consult:  Asked by Dr Jim Topete M.D. to see this patient with chest pain    CC:   Chief Complaint   Patient presents with   • Chest Pain       HPI:      56 year old man with PMH CAD / MI s/p PCI LCx 2015 following thrombolytic therapy, HTN, HLD, active tobacco presents with recurrent chest pain this morning about 11am. 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 Patient ran out after him. Soon after developed chest pain. Found ACS on arrival and treated with aspirin, heparin bolus, nitro, and morphine. Transferred for further management. Patient admits that after his last MI dropped his medications after about 6 months due to adverse effects and feeling ill. Currently smoking tobacco. Tried to stop for 40 days but resumed when was seeing friends. Father with coronary disease leading to MI in 60s.     Medications / Drug list prior to admission:  No current facility-administered medications on file prior to encounter.     Current Outpatient Medications on File Prior to Encounter   Medication Sig Dispense Refill   • prasugrel (EFFIENT) 10 MG Tab Take 1 Tab by mouth every day. 90 Tab 3   • atorvastatin (LIPITOR) 40 MG TABS Take 1 Tab by mouth every evening. 30 Tab 11   • lisinopril (PRINIVIL) 5 MG TABS Take 1 Tab by mouth every day. 30 Tab 11   • metoprolol (LOPRESSOR) 25 MG TABS Take 1 Tab by mouth 2 Times a Day. 60 Tab 11   • aspirin (ASA) 81 MG CHEW chewable tablet Take 1 Tab by mouth every day. 100 Tab 11       Current list of administered Medications:    Current Facility-Administered Medications:   •  morphine 4 MG/ML injection 4 mg, 4 mg, Intravenous, Once, Jim Topete M.D.  •  ondansetron (ZOFRAN) syringe/vial injection 4 mg, 4 mg, Intravenous, Once, Jim Topete M.D.  •  heparin infusion 25,000 units in 500 mL 0.45% NACL, 0-30 Units/kg/hr, Intravenous, Continuous, Jared Rajan M.D.  •  heparin injection 4,000 Units, 4,000 Units,  Intravenous, Once, Jared Rajan M.D.  •  heparin injection 2,000 Units, 2,000 Units, Intravenous, PRN, Jared Rajan M.D.  •  carvedilol (COREG) tablet 6.25 mg, 6.25 mg, Oral, BID WITH MEALS, aJred Rajan M.D.  •  rosuvastatin (CRESTOR) tablet 20 mg, 20 mg, Oral, Q EVENING, aJred Rajan M.D.    Current Outpatient Medications:   •  prasugrel (EFFIENT) 10 MG Tab, Take 1 Tab by mouth every day., Disp: 90 Tab, Rfl: 3  •  atorvastatin (LIPITOR) 40 MG TABS, Take 1 Tab by mouth every evening., Disp: 30 Tab, Rfl: 11  •  lisinopril (PRINIVIL) 5 MG TABS, Take 1 Tab by mouth every day., Disp: 30 Tab, Rfl: 11  •  metoprolol (LOPRESSOR) 25 MG TABS, Take 1 Tab by mouth 2 Times a Day., Disp: 60 Tab, Rfl: 11  •  aspirin (ASA) 81 MG CHEW chewable tablet, Take 1 Tab by mouth every day., Disp: 100 Tab, Rfl: 11    History reviewed. No pertinent past medical history.    History reviewed. No pertinent surgical history.    History reviewed. No pertinent family history.  Patient family history was personally reviewed, no pertinent family history to current presentation    Social History     Socioeconomic History   • Marital status: Single     Spouse name: Not on file   • Number of children: Not on file   • Years of education: Not on file   • Highest education level: Not on file   Occupational History   • Not on file   Tobacco Use   • Smoking status: Current Every Day Smoker     Packs/day: 0.50     Types: Cigarettes   • Smokeless tobacco: Never Used   Vaping Use   • Vaping Use: Never used   Substance and Sexual Activity   • Alcohol use: Yes     Alcohol/week: 1.2 oz     Types: 2 Cans of beer per week     Comment: occ   • Drug use: Yes     Types: Inhaled     Comment: marijuana   • Sexual activity: Not on file   Other Topics Concern   • Not on file   Social History Narrative   • Not on file     Social Determinants of Health     Financial Resource Strain: Not on file   Food Insecurity: Not on file   Transportation Needs: Not on file  "  Physical Activity: Not on file   Stress: Not on file   Social Connections: Not on file   Intimate Partner Violence: Not on file   Housing Stability: Not on file       ALLERGIES:  No Known Allergies    Review of systems:  A complete review of symptoms was reviewed with patient. This is reviewed in H&P and PMH. ALL OTHERS reviewed and negative    Physical exam:  Patient Vitals for the past 24 hrs:   BP Temp Temp src Pulse Resp SpO2 Height Weight   04/12/22 1826 (!) 178/118 -- -- -- -- -- -- --   04/12/22 1743 (!) 166/99 -- -- (!) 58 (!) 22 97 % -- --   04/12/22 1734 (!) 178/108 36.5 °C (97.7 °F) Temporal (!) 59 16 94 % -- --   04/12/22 1733 -- -- -- -- -- -- 1.702 m (5' 7\") 77.1 kg (170 lb)     General: No acute distress.   EYES: no jaundice  HEENT: OP clear   Neck: No bruits No JVD.   CVS:  RRR. S1 + S2. No M/R/G. No edema.  Resp: CTAB. No wheezing or crackles/rhonchi.  Abdomen: Soft, NT, ND,  Skin: Grossly nothing acute no obvious rashes  Neurological: Alert, Moves all extremities, no cranial nerve defects on limited exam  Extremities: Pulse 2+ in b/l LE. No cyanosis.     Data:  Laboratory studies personally reviewed by me:  Recent Results (from the past 24 hour(s))   CBC WITH DIFFERENTIAL    Collection Time: 04/12/22  5:34 PM   Result Value Ref Range    WBC 11.8 (H) 4.8 - 10.8 K/uL    RBC 5.31 4.70 - 6.10 M/uL    Hemoglobin 16.0 14.0 - 18.0 g/dL    Hematocrit 46.0 42.0 - 52.0 %    MCV 86.6 81.4 - 97.8 fL    MCH 30.1 27.0 - 33.0 pg    MCHC 34.8 33.7 - 35.3 g/dL    RDW 38.4 35.9 - 50.0 fL    Platelet Count 188 164 - 446 K/uL    MPV 10.8 9.0 - 12.9 fL    Neutrophils-Polys 76.30 (H) 44.00 - 72.00 %    Lymphocytes 19.10 (L) 22.00 - 41.00 %    Monocytes 3.90 0.00 - 13.40 %    Eosinophils 0.00 0.00 - 6.90 %    Basophils 0.20 0.00 - 1.80 %    Immature Granulocytes 0.50 0.00 - 0.90 %    Nucleated RBC 0.00 /100 WBC    Neutrophils (Absolute) 9.04 (H) 1.82 - 7.42 K/uL    Lymphs (Absolute) 2.26 1.00 - 4.80 K/uL    Monos " (Absolute) 0.46 0.00 - 0.85 K/uL    Eos (Absolute) 0.00 0.00 - 0.51 K/uL    Baso (Absolute) 0.02 0.00 - 0.12 K/uL    Immature Granulocytes (abs) 0.06 0.00 - 0.11 K/uL    NRBC (Absolute) 0.00 K/uL   COMP METABOLIC PANEL    Collection Time: 22  5:34 PM   Result Value Ref Range    Sodium 139 135 - 145 mmol/L    Potassium 4.0 3.6 - 5.5 mmol/L    Chloride 107 96 - 112 mmol/L    Co2 22 20 - 33 mmol/L    Anion Gap 10.0 7.0 - 16.0    Glucose 102 (H) 65 - 99 mg/dL    Bun 12 8 - 22 mg/dL    Creatinine 0.66 0.50 - 1.40 mg/dL    Calcium 7.8 (L) 8.5 - 10.5 mg/dL    AST(SGOT) 44 12 - 45 U/L    ALT(SGPT) 27 2 - 50 U/L    Alkaline Phosphatase 84 30 - 99 U/L    Total Bilirubin 0.5 0.1 - 1.5 mg/dL    Albumin 3.8 3.2 - 4.9 g/dL    Total Protein 6.1 6.0 - 8.2 g/dL    Globulin 2.3 1.9 - 3.5 g/dL    A-G Ratio 1.7 g/dL   TROPONIN    Collection Time: 22  5:34 PM   Result Value Ref Range    Troponin T 100 (H) 6 - 19 ng/L   ESTIMATED GFR    Collection Time: 22  5:34 PM   Result Value Ref Range    GFR (CKD-EPI) 111 >60 mL/min/1.73 m 2   EKG (NOW)    Collection Time: 22  6:30 PM   Result Value Ref Range    Report       [facility] Emergency Dept.    Test Date:  2022  Pt Name:    ART GARCIA                  Department: ER  MRN:        [id number]                      Room:       RD 12  Gender:     Male                         Technician: [id number]  :        1965                   Requested By:GUNNAR GOETZ  Order #:    [id number]                    Reading MD:    Measurements  Intervals                                Axis  Rate:       55                           P:          60  KS:         136                          QRS:        42  QRSD:       86                           T:          65  QT:         412  QTc:        394    Interpretive Statements  SINUS BRADYCARDIA  Compared to ECG 2015 11:07:15  Sinus rhythm no longer present         EKG 22 117 interpreted by me sinus verito  55    All pertinent features of laboratory and imaging reviewed including primary images where applicable      Active Problems:    * No active hospital problems. *  Resolved Problems:    * No resolved hospital problems. *      Assessment / Plan:  56 year old man with PMH CAD / MI s/p PCI LCx 2015 following thrombolytic therapy, HTN, HLD, active tobacco presents with recurrent chest pain this morning found NSTEMI    -trend troponin to peak. Repeat EKG alongside.  -aspirin and heparin gtt for now  -high intensity statin  -BB when heart rate improves  -ARB for BP control  -PRN labetalol for BP control  -nitro gtt for anginal pain  -plan [facility] tomorrow if able to control symptoms otherwise may need to advance timing  -TTE  -tobacco abuse - discussed 6 min. Nicotine patch inpatient. chantix at discharge. Agrees to quit.     I personally discussed his case with Dr Topete and Dr Rain.     It is my pleasure to participate in the care of Mr. Corrales.  Please do not hesitate to contact me with questions or concerns.    Jared Rajan MD  Cardiologist [facility]

## 2022-04-13 NOTE — ED PROVIDER NOTES
CHIEF COMPLAINT  Chief Complaint   Patient presents with   • Chest Pain       HPI  Joe Corrales is a 56 y.o. male past medical history significant for CAD status post NIURKA to LCx in 2015 who was transferred here from [facility] for Cardiology evaluation for possible NSTEMI.  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  Associated with dyspnea, diaphoresis, palpitations, and anxiety.  At [facility], he was treated with aspirin, morphine, nitroglycerin and bolused with heparin for possible NSTEMI.  Troponin there reportedly 0.10 -> 0.03 -> 0.09.  CXR there was reportedly unremarkable.  On exam, the patient states he is still experiencing 3 out of 10 chest pain.  He says the chest pain is very similar in characteristic to that of his heart attack in 2015.  He says he stopped taking all of his medications 6 months after his heart attack in 2015.  He reports family history of heart attack in his father and stroke in his mother.  He reports he is a current smoker of 1 pack every 2 to 3 days.  He uses marijuana occasionally but denies use of any other illicit substances.    REVIEW OF SYSTEMS  All other systems are negative.     PAST MEDICAL HISTORY  History reviewed. No pertinent past medical history.    FAMILY HISTORY  History reviewed. No pertinent family history.    SOCIAL HISTORY  Social History     Socioeconomic History   • Marital status: Single   Tobacco Use   • Smoking status: Current Every Day Smoker     Packs/day: 0.50     Types: Cigarettes   • Smokeless tobacco: Never Used   Vaping Use   • Vaping Use: Never used   Substance and Sexual Activity   • Alcohol use: Yes     Alcohol/week: 1.2 oz     Types: 2 Cans of beer per week     Comment: occ   • Drug use: Yes     Types: Inhaled     Comment: marijuana       SURGICAL HISTORY  History reviewed. No pertinent surgical  "history.    CURRENT MEDICATIONS  Home Medications    **Home medications have not yet been reviewed for this encounter**         ALLERGIES  No Known Allergies    PHYSICAL EXAM  VITAL SIGNS: BP (!) 166/99   Pulse (!) 58   Temp 36.5 °C (97.7 °F) (Temporal)   Resp (!) 22   Ht 1.702 m (5' 7\")   Wt 77.1 kg (170 lb)   SpO2 97%   BMI 26.63 kg/m²      Constitutional: Well developed, Well nourished, mild distress, Non-toxic appearance.   HENT: Normocephalic, Atraumatic, mucous membranes moist, no erythema, exudates, swelling, or masses, nares patent  Eyes: nonicteric  Neck: Supple, no meningismus  Lymphatic: No lymphadenopathy noted.   Cardiovascular: Regular rate and rhythm, no gallops rubs or murmurs, chest pain not reproducible with palpation  Lungs: Clear bilaterally   Abdomen: Bowel sounds normal, Soft, No tenderness, No pulsatile masses.   Skin: Warm, Dry, no rash  Back: No tenderness, No CVA tenderness.   Genitalia: Deferred  Rectal: Deferred  Extremities: No edema  Neurologic: Alert, appropriate, follows commands, moving all extremities, normal speech   Psychiatric: Affect normal    EKG  Results for orders placed or performed during the hospital encounter of 22   EKG (NOW)   Result Value Ref Range    Report       [facility]    Test Date:  2022  Pt Name:    ART GARCIA                  Department: ER  MRN:        [id number]                      Room:        12  Gender:     Male                         Technician: [id number]  :        1965                   Requested By:GUNNAR GOETZ  Order #:    [id number]                    Reading MD:    Measurements  Intervals                                Axis  Rate:       55                           P:          60  AL:         136                          QRS:        42  QRSD:       86                           T:          65  QT:         412  QTc:        394    Interpretive Statements  SINUS BRADYCARDIA  Compared to ECG " 08/01/2015 11:07:15  Sinus rhythm no longer present       Results for orders placed or performed during the hospital encounter of 04/12/22   CBC WITH DIFFERENTIAL   Result Value Ref Range    WBC 11.8 (H) 4.8 - 10.8 K/uL    RBC 5.31 4.70 - 6.10 M/uL    Hemoglobin 16.0 14.0 - 18.0 g/dL    Hematocrit 46.0 42.0 - 52.0 %    MCV 86.6 81.4 - 97.8 fL    MCH 30.1 27.0 - 33.0 pg    MCHC 34.8 33.7 - 35.3 g/dL    RDW 38.4 35.9 - 50.0 fL    Platelet Count 188 164 - 446 K/uL    MPV 10.8 9.0 - 12.9 fL    Neutrophils-Polys 76.30 (H) 44.00 - 72.00 %    Lymphocytes 19.10 (L) 22.00 - 41.00 %    Monocytes 3.90 0.00 - 13.40 %    Eosinophils 0.00 0.00 - 6.90 %    Basophils 0.20 0.00 - 1.80 %    Immature Granulocytes 0.50 0.00 - 0.90 %    Nucleated RBC 0.00 /100 WBC    Neutrophils (Absolute) 9.04 (H) 1.82 - 7.42 K/uL    Lymphs (Absolute) 2.26 1.00 - 4.80 K/uL    Monos (Absolute) 0.46 0.00 - 0.85 K/uL    Eos (Absolute) 0.00 0.00 - 0.51 K/uL    Baso (Absolute) 0.02 0.00 - 0.12 K/uL    Immature Granulocytes (abs) 0.06 0.00 - 0.11 K/uL    NRBC (Absolute) 0.00 K/uL   COMP METABOLIC PANEL   Result Value Ref Range    Sodium 139 135 - 145 mmol/L    Potassium 4.0 3.6 - 5.5 mmol/L    Chloride 107 96 - 112 mmol/L    Co2 22 20 - 33 mmol/L    Anion Gap 10.0 7.0 - 16.0    Glucose 102 (H) 65 - 99 mg/dL    Bun 12 8 - 22 mg/dL    Creatinine 0.66 0.50 - 1.40 mg/dL    Calcium 7.8 (L) 8.5 - 10.5 mg/dL    AST(SGOT) 44 12 - 45 U/L    ALT(SGPT) 27 2 - 50 U/L    Alkaline Phosphatase 84 30 - 99 U/L    Total Bilirubin 0.5 0.1 - 1.5 mg/dL    Albumin 3.8 3.2 - 4.9 g/dL    Total Protein 6.1 6.0 - 8.2 g/dL    Globulin 2.3 1.9 - 3.5 g/dL    A-G Ratio 1.7 g/dL   TROPONIN   Result Value Ref Range    Troponin T 100 (H) 6 - 19 ng/L   ESTIMATED GFR   Result Value Ref Range    GFR (CKD-EPI) 111 >60 mL/min/1.73 m 2   aPTT   Result Value Ref Range    APTT 90.2 (H) 24.7 - 36.0 sec   Prothrombin Time   Result Value Ref Range    PT 13.4 12.0 - 14.6 sec    INR 1.05 0.87 - 1.13    Heparin Xa (Unfractionated)   Result Value Ref Range    Heparin Xa (UFH) 0.39 IU/mL   EKG (NOW)   Result Value Ref Range    Report       [facility] Emergency Dept.    Test Date:  2022  Pt Name:    ART CORRALES                  Department: ER  MRN:        [id number]                      Room:       RD 12  Gender:     Male                         Technician: [id number]  :        1965                   Requested By:JIM TOPETE  Order #:    [id number]                    Reading MD:    Measurements  Intervals                                Axis  Rate:       55                           P:          60  WA:         136                          QRS:        42  QRSD:       86                           T:          65  QT:         412  QTc:        394    Interpretive Statements  SINUS BRADYCARDIA  Compared to ECG 2015 11:07:15  Sinus rhythm no longer present           RADIOLOGY/PROCEDURES  EC-ECHOCARDIOGRAM COMPLETE W/O CONT    (Results Pending)         COURSE & MEDICAL DECISION MAKING  Pertinent Labs & Imaging studies reviewed. (See chart for details)  Mr. Corrales's chest pain is likely due to an NSTEMI.  Heart score 7.  EKG done the outside facility showed ST changes in inferior leads no longer present on repeat EKG here.  CXR at [facility] reportedly unremarkable. Troponin here mildly elevated 100.  He was given morphine for pain and labetalol for high BP.  Cardiology (Dr. Rajan) has been notified and are planning for left heart catheterization tomorrow.  He will be continued on heparin gtt and admitted.  Echo, statin, and nitroglycerin were ordered by Cardiology.    FINAL IMPRESSION  1. NSTEMI  2. Hypertension  3. Medication nonadherence  4. Tobacco use       Seen by PGY-2 Gopal Rain M.D.    Electronically signed by: Jim Topete M.D., 2022 6:08 PM

## 2022-04-13 NOTE — ED NOTES
Nitro drip rate changed to 30mcg/min. Quickly after pt became nauseous and dizzy. Rate reduced back to 20mcg/min.

## 2022-04-13 NOTE — PROGRESS NOTES
Cardiology Follow Up Progress Note    Date of Service  4/13/2022    Attending Physician  PARAG Connelly M.D.    Chief Complaint   Chest pain     HPI  Joe Corrales is a 56 y.o. male admitted 4/12/2022 with chest pain. Past medical history of CAD with PCI LCx 2015 following thrombolytic therapy, hypertension. Hyperlipidemia, and tobacco abuse.    NSTEMI trop peak at 902    Interim Events  Resting in bed, mild chest pain.   Vs stable  Plan for coronary angiogram today   K 4.5  LDL 90    Review of Systems  Review of Systems   Constitutional: Negative for chills, diaphoresis and fever.   HENT: Negative for nosebleeds and trouble swallowing.    Respiratory: Positive for chest tightness. Negative for cough and shortness of breath.    Cardiovascular: Negative for chest pain, palpitations and leg swelling.   Gastrointestinal: Negative for abdominal distention, abdominal pain and blood in stool.   Genitourinary: Negative for hematuria.   Skin: Negative for color change.   Neurological: Negative for dizziness, syncope and numbness.   Psychiatric/Behavioral: Negative for agitation and confusion. The patient is not nervous/anxious.        Vital signs in last 24 hours  Temp:  [36.2 °C (97.2 °F)-36.6 °C (97.9 °F)] 36.2 °C (97.2 °F)  Pulse:  [50-62] 54  Resp:  [16-23] 18  BP: (111-178)/() 112/66  SpO2:  [93 %-97 %] 95 %    Physical Exam  Physical Exam  Vitals and nursing note reviewed.   Constitutional:       Appearance: Normal appearance.   HENT:      Head: Normocephalic and atraumatic.   Eyes:      Pupils: Pupils are equal, round, and reactive to light.   Cardiovascular:      Rate and Rhythm: Normal rate and regular rhythm.      Heart sounds: Normal heart sounds. No murmur heard.  Pulmonary:      Effort: Pulmonary effort is normal.      Breath sounds: Normal breath sounds.   Abdominal:      General: Abdomen is flat.   Musculoskeletal:      Cervical back: Normal range of motion.   Skin:     General: Skin is warm and  dry.   Neurological:      General: No focal deficit present.      Mental Status: He is alert and oriented to person, place, and time.   Psychiatric:         Mood and Affect: Mood normal.         Behavior: Behavior normal.         Thought Content: Thought content normal.         Judgment: Judgment normal.         Lab Review  Lab Results   Component Value Date/Time    WBC 12.0 (H) 04/13/2022 06:39 AM    RBC 5.30 04/13/2022 06:39 AM    HEMOGLOBIN 15.9 04/13/2022 06:39 AM    HEMATOCRIT 45.6 04/13/2022 06:39 AM    MCV 86.0 04/13/2022 06:39 AM    MCH 30.0 04/13/2022 06:39 AM    MCHC 34.9 04/13/2022 06:39 AM    MPV 10.7 04/13/2022 06:39 AM      Lab Results   Component Value Date/Time    SODIUM 133 (L) 04/13/2022 06:39 AM    POTASSIUM 4.5 04/13/2022 06:39 AM    CHLORIDE 102 04/13/2022 06:39 AM    CO2 20 04/13/2022 06:39 AM    GLUCOSE 113 (H) 04/13/2022 06:39 AM    BUN 14 04/13/2022 06:39 AM    CREATININE 0.67 04/13/2022 06:39 AM      Lab Results   Component Value Date/Time    ASTSGOT 44 04/12/2022 05:34 PM    ALTSGPT 27 04/12/2022 05:34 PM     Lab Results   Component Value Date/Time    CHOLSTRLTOT 164 04/13/2022 06:39 AM    LDL 90 04/13/2022 06:39 AM    HDL 39 (A) 04/13/2022 06:39 AM    TRIGLYCERIDE 175 (H) 04/13/2022 06:39 AM    TROPONINT 902 (H) 04/13/2022 06:39 AM       No results for input(s): NTPROBNP in the last 72 hours.    Cardiac Imaging and Procedures Review  EKG:    SINUS BRADYCARDIA   PROBABLE INFERIOR INFARCT, OLD   NONSPECIFIC T ABNORMALITIES, ANT-LAT LEADS   Compared to ECG 04/13/2022 01:25:44   T-wave abnormality now present   Myocardial infarct finding still present   Electronically Signed On 4- 4:40:36 PDT by Jared Rajan MD       Echocardiogram:  Pending     Cardiac Catheterization:  Pending     Assessment/Plan  No new Assessment & Plan notes have been filed under this hospital service since the last note was generated.  Service: Cardiology    1. NSTEMI:  - trop T peaked at 902  - plan for  coronary angiogram today, NPO since midnight   - ECHO ordered and pending   - continue asa, rosuvastatin 20mg qd , and heparin gtt   - continue losartan 25mg qd and metoprolol 25mg BID     2. Tobacco abuse:  - smoking cessation     I personally spent a total of 10 minutes which includes face-to-face time and non-face-to-face time spent on preparing to see the patient, reviewing hospital notes and tests, obtaining history from the patient, performing a medically appropriate exam, counseling and educating the patient, ordering medications/tests/procedures/referrals as clinically indicated, and documenting information in the electronic medical record.    Thank you for allowing me to participate in the care of this patient.  I will continue to follow this patient    Please contact me with any questions.    JV Cornejo.   Cardiologist, [facility]  [phone]

## 2022-04-13 NOTE — ED TRIAGE NOTES
Pt arrives via flight from [place] for chest pain and Nstemi per transfer. Pt states felt really diaphoretic this morning had 6/10 chest pain went to the ER per [place] pt has Nstemi arrives on heperain 12 units enroute given 2 morphine and 8 zofran now 2/10 pain

## 2022-04-13 NOTE — ED NOTES
Med rec completed per patient  Allergies reviewed  No PO Antibiotics in the last 30 days     Patient states he does not take any medications, RX or OTC

## 2022-04-13 NOTE — PROGRESS NOTES
Pt A&O4. VSS. Transported to the floor will Zoll monitor. Assumed pt care, report received. Pt placed on tele box monitor room notified.

## 2022-04-13 NOTE — ED NOTES
Angie PIRES at bedside for report and transport. All questions answered. Heparin signed off. NTG drip at 20mcg/min continuous.   Pt medicated with Melatonin.

## 2022-04-13 NOTE — ED NOTES
T7 called to advise the pt is ready for transport.   Pt requesting that he put cash in safekeeping. PAR notified.

## 2022-04-13 NOTE — PROGRESS NOTES
Report received from night shift RN, assumed care of pt. Pt A&Ox4. Plan of care discussed with pt, labs and chart reviewed. All needs met at this time. Tele box on. On 2L O2 via NC. Heparin drip verified. Call light within reach, bed locked and in lowest position. All fall precautions and hourly rounding in place. Will continue to monitor.

## 2022-04-13 NOTE — ED NOTES
"Medication Reconciliation Complete.     Allergies reviewed.   No ABX in last 30 days.   Patient stated he decided to take himself off all prescription medication after his heart attack in 2015, \"This isn't living\". Not currently taking any prescription medications.   [facility]: [facility] [phone]  "

## 2022-04-13 NOTE — PROGRESS NOTES
Daily Progress Note:     Date of Service: 4/13/2022  Primary Team: [facility] Team   Attending: PARAG Connelly M.D.   Senior Resident: Dr. Ezra Baugh  Intern: Dr. Alma Rosa Juarez  Contact:  [phone]    ID:   56-year-old man with past medical history of CAD status post PCI with stent placement to left circumflex artery in 2015, hypertension, hyperlipidemia, current smoker and alcohol user presented on 4/12/2022 with chest pain after he started chasing his dog on the street.  Patient has been noncompliant with medications since 2015, stating that he was on medication only for 6 months following his stent placement.    Interval:  No acute events reported overnight.  Patient states that he had 3+ pain at night, explained this morning is 1+ and yesterday was about 2+ patient currently denies any diaphoresis, shortness of breath, nausea, vomiting.  All other review of systems are nonsignificant.  Patient is pending catheterization by cardiology.    Consultants/Specialty:  Cardiology    Review of Systems:   Review of Systems   Constitutional: Negative for chills, fever, malaise/fatigue and weight loss.   HENT: Negative for sinus pain and sore throat.    Eyes: Negative for pain, discharge and redness.   Respiratory: Negative for cough, hemoptysis, sputum production, shortness of breath, wheezing and stridor.    Cardiovascular: Positive for chest pain. Negative for palpitations, orthopnea, leg swelling and PND.   Gastrointestinal: Negative for constipation, nausea and vomiting.   Genitourinary: Negative for frequency, hematuria and urgency.   Skin: Negative for itching and rash.   Neurological: Negative for speech change, focal weakness and weakness.       Objective Data:   Physical Exam:   Vitals:   Temp:  [36.2 °C (97.2 °F)-36.9 °C (98.5 °F)] 36.8 °C (98.2 °F)  Pulse:  [50-65] 56  Resp:  [16-23] 17  BP: (111-178)/() 131/86  SpO2:  [92 %-97 %] 92 %  Physical Exam  Constitutional:       Appearance: Normal  appearance. He is obese.   HENT:      Nose: Nose normal. No congestion or rhinorrhea.      Mouth/Throat:      Pharynx: No oropharyngeal exudate or posterior oropharyngeal erythema.   Eyes:      Extraocular Movements: Extraocular movements intact.   Cardiovascular:      Rate and Rhythm: Normal rate and regular rhythm.      Heart sounds: No murmur heard.    No friction rub. No gallop.   Pulmonary:      Effort: Pulmonary effort is normal.   Abdominal:      General: Abdomen is flat. Bowel sounds are normal. There is no distension.      Palpations: Abdomen is soft.      Tenderness: There is no abdominal tenderness. There is no guarding or rebound.   Musculoskeletal:      Cervical back: No rigidity.      Right lower leg: No edema.      Left lower leg: No edema.   Skin:     Capillary Refill: Capillary refill takes less than 2 seconds.      Coloration: Skin is not jaundiced.   Neurological:      General: No focal deficit present.      Mental Status: He is alert and oriented to person, place, and time.           Labs:   Most Recent Labs:    Lab Results   Component Value Date/Time    WBC 12.0 (H) 04/13/2022 06:39 AM    RBC 5.30 04/13/2022 06:39 AM    HEMOGLOBIN 15.9 04/13/2022 06:39 AM    HEMATOCRIT 45.6 04/13/2022 06:39 AM    MCV 86.0 04/13/2022 06:39 AM    MCH 30.0 04/13/2022 06:39 AM    MCHC 34.9 04/13/2022 06:39 AM    MPV 10.7 04/13/2022 06:39 AM    NEUTSPOLYS 76.30 (H) 04/12/2022 05:34 PM    LYMPHOCYTES 19.10 (L) 04/12/2022 05:34 PM    MONOCYTES 3.90 04/12/2022 05:34 PM    EOSINOPHILS 0.00 04/12/2022 05:34 PM    BASOPHILS 0.20 04/12/2022 05:34 PM      Lab Results   Component Value Date/Time    SODIUM 133 (L) 04/13/2022 06:39 AM    POTASSIUM 4.5 04/13/2022 06:39 AM    CHLORIDE 102 04/13/2022 06:39 AM    CO2 20 04/13/2022 06:39 AM    GLUCOSE 113 (H) 04/13/2022 06:39 AM    BUN 14 04/13/2022 06:39 AM    CREATININE 0.67 04/13/2022 06:39 AM      Lab Results   Component Value Date/Time    ALTSGPT 27 04/12/2022 05:34 PM     ASTSGOT 44 04/12/2022 05:34 PM    ALKPHOSPHAT 84 04/12/2022 05:34 PM    TBILIRUBIN 0.5 04/12/2022 05:34 PM    LIPASE 48 07/30/2015 02:25 AM    ALBUMIN 3.8 04/12/2022 05:34 PM    GLOBULIN 2.3 04/12/2022 05:34 PM    INR 1.05 04/12/2022 05:34 PM     Lab Results   Component Value Date/Time    PROTHROMBTM 13.4 04/12/2022 05:34 PM    INR 1.05 04/12/2022 05:34 PM        Imaging:   EC-ECHOCARDIOGRAM COMPLETE W/O CONT   Final Result      CL-LEFT HEART CATHETERIZATION WITH POSSIBLE INTERVENTION    (Results Pending)     Problem Representation:     * NSTEMI (non-ST elevated myocardial infarction) (HCC)- (present on admission)  Assessment & Plan  Likely NSTEMI due to elevated troponin and no ST elevation in EKG.  Patient started having chest pain after running after his dog on the street.  Patient has been noncompliant with medications since 2015 after he had stent placement.  He only took medication for 6 months at that time.  -Telemetry  -Continue aspirin  -Continue Metroprolol tartrate 25 mg twice daily  -Continue Heparin therapeutic dose  -Continue nitroglycerin  -Follow-up with cardiology on cardiac catheterization results.    Non compliance with medical treatment  Assessment & Plan  Patient has been noncompliant since 2000 6:15 months that he was on medication for his stent placement.  Patient states that he felt horrible on medication when he quit all.  -Advised patient to continue his medication to lower the risk for coronary artery disease.    Dyslipidemia  Assessment & Plan  Total cholesterol: 164  LDL: 90  HDL: 39  Patient not on any statin due to noncompliance with medications.  High risk for CAD.  -Continue rosuvastatin.    Leukocytosis  Assessment & Plan  Likely reactive leukocytosis due to pain, versus low suspicion of infection.  -Continue to monitor CBCs    Alcohol abuse  Assessment & Plan  History of alcohol use disorder.  Alcohol: 2 beers per 3 days  -Advised patient to quit drinking due to his high risk for  coronary artery disease.    Tobacco abuse  Assessment & Plan  History of tobacco use disorder.  Smokes 1 pack per 2 days.  -Advised patient on quitting smoking due to his coronary artery disease.

## 2022-04-13 NOTE — PROGRESS NOTES
4 Eyes Skin Assessment Completed by LEXIS Hills and LEXIS Adams.    Head WDL  Ears WDL  Nose WDL  Mouth WDL  Neck WDL  Breast/Chest WDL  Shoulder Blades WDL  Spine WDL  (R) Arm/Elbow/Hand WDL  (L) Arm/Elbow/Hand WDL  Abdomen WDL  Groin WDL  Scrotum/Coccyx/Buttocks WDL  (R) Leg WDL  (L) Leg WDL  (R) Heel/Foot/Toe WDL  (L) Heel/Foot/Toe WDL          Devices In Places Tele Box and Pulse Ox      Interventions In Place Pillows and Pressure Redistribution Mattress    Possible Skin Injury No    Pictures Uploaded Into Epic N/A  Wound Consult Placed N/A  RN Wound Prevention Protocol Ordered No

## 2022-04-13 NOTE — ASSESSMENT & PLAN NOTE
Patient has been noncompliant since 2000 6:15 months that he was on medication for his stent placement.  Patient states that he felt horrible on medication when he quit all.  -Advised patient to continue his medication to lower the risk for coronary artery disease.

## 2022-04-14 ENCOUNTER — PHARMACY VISIT (OUTPATIENT)
Dept: PHARMACY | Facility: MEDICAL CENTER | Age: 57
End: 2022-04-14
Payer: COMMERCIAL

## 2022-04-14 ENCOUNTER — PATIENT OUTREACH (OUTPATIENT)
Dept: HEALTH INFORMATION MANAGEMENT | Facility: OTHER | Age: 57
End: 2022-04-14

## 2022-04-14 VITALS
SYSTOLIC BLOOD PRESSURE: 122 MMHG | TEMPERATURE: 98.7 F | WEIGHT: 175.27 LBS | HEART RATE: 64 BPM | HEIGHT: 67 IN | RESPIRATION RATE: 18 BRPM | DIASTOLIC BLOOD PRESSURE: 86 MMHG | BODY MASS INDEX: 27.51 KG/M2 | OXYGEN SATURATION: 96 %

## 2022-04-14 LAB
ACT BLD: 238 SEC (ref 74–137)
ACT BLD: 255 SEC (ref 74–137)
ACT BLD: 422 SEC (ref 74–137)
ALBUMIN SERPL BCP-MCNC: 3.7 G/DL (ref 3.2–4.9)
ALBUMIN/GLOB SERPL: 1.4 G/DL
ALP SERPL-CCNC: 92 U/L (ref 30–99)
ALT SERPL-CCNC: 51 U/L (ref 2–50)
ANION GAP SERPL CALC-SCNC: 10 MMOL/L (ref 7–16)
AST SERPL-CCNC: 213 U/L (ref 12–45)
BASOPHILS # BLD AUTO: 0.3 % (ref 0–1.8)
BASOPHILS # BLD: 0.03 K/UL (ref 0–0.12)
BILIRUB SERPL-MCNC: 0.8 MG/DL (ref 0.1–1.5)
BUN SERPL-MCNC: 10 MG/DL (ref 8–22)
CALCIUM SERPL-MCNC: 8.3 MG/DL (ref 8.5–10.5)
CHLORIDE SERPL-SCNC: 104 MMOL/L (ref 96–112)
CO2 SERPL-SCNC: 21 MMOL/L (ref 20–33)
CREAT SERPL-MCNC: 0.6 MG/DL (ref 0.5–1.4)
EKG IMPRESSION: NORMAL
EOSINOPHIL # BLD AUTO: 0 K/UL (ref 0–0.51)
EOSINOPHIL NFR BLD: 0 % (ref 0–6.9)
ERYTHROCYTE [DISTWIDTH] IN BLOOD BY AUTOMATED COUNT: 39.7 FL (ref 35.9–50)
EST. AVERAGE GLUCOSE BLD GHB EST-MCNC: 103 MG/DL
GFR SERPLBLD CREATININE-BSD FMLA CKD-EPI: 114 ML/MIN/1.73 M 2
GLOBULIN SER CALC-MCNC: 2.6 G/DL (ref 1.9–3.5)
GLUCOSE SERPL-MCNC: 104 MG/DL (ref 65–99)
HBA1C MFR BLD: 5.2 % (ref 4–5.6)
HCT VFR BLD AUTO: 44.4 % (ref 42–52)
HGB BLD-MCNC: 15.2 G/DL (ref 14–18)
IMM GRANULOCYTES # BLD AUTO: 0.05 K/UL (ref 0–0.11)
IMM GRANULOCYTES NFR BLD AUTO: 0.5 % (ref 0–0.9)
LYMPHOCYTES # BLD AUTO: 2.14 K/UL (ref 1–4.8)
LYMPHOCYTES NFR BLD: 20.8 % (ref 22–41)
MAGNESIUM SERPL-MCNC: 2.1 MG/DL (ref 1.5–2.5)
MCH RBC QN AUTO: 30.2 PG (ref 27–33)
MCHC RBC AUTO-ENTMCNC: 34.2 G/DL (ref 33.7–35.3)
MCV RBC AUTO: 88.1 FL (ref 81.4–97.8)
MONOCYTES # BLD AUTO: 0.9 K/UL (ref 0–0.85)
MONOCYTES NFR BLD AUTO: 8.8 % (ref 0–13.4)
NEUTROPHILS # BLD AUTO: 7.16 K/UL (ref 1.82–7.42)
NEUTROPHILS NFR BLD: 69.6 % (ref 44–72)
NRBC # BLD AUTO: 0 K/UL
NRBC BLD-RTO: 0 /100 WBC
PLATELET # BLD AUTO: 165 K/UL (ref 164–446)
PMV BLD AUTO: 10.9 FL (ref 9–12.9)
POTASSIUM SERPL-SCNC: 4 MMOL/L (ref 3.6–5.5)
PROT SERPL-MCNC: 6.3 G/DL (ref 6–8.2)
RBC # BLD AUTO: 5.04 M/UL (ref 4.7–6.1)
SODIUM SERPL-SCNC: 135 MMOL/L (ref 135–145)
WBC # BLD AUTO: 10.3 K/UL (ref 4.8–10.8)

## 2022-04-14 PROCEDURE — A9270 NON-COVERED ITEM OR SERVICE: HCPCS | Performed by: INTERNAL MEDICINE

## 2022-04-14 PROCEDURE — 700102 HCHG RX REV CODE 250 W/ 637 OVERRIDE(OP): Performed by: INTERNAL MEDICINE

## 2022-04-14 PROCEDURE — 700102 HCHG RX REV CODE 250 W/ 637 OVERRIDE(OP): Performed by: NURSE PRACTITIONER

## 2022-04-14 PROCEDURE — 85025 COMPLETE CBC W/AUTO DIFF WBC: CPT

## 2022-04-14 PROCEDURE — 83036 HEMOGLOBIN GLYCOSYLATED A1C: CPT

## 2022-04-14 PROCEDURE — 93010 ELECTROCARDIOGRAM REPORT: CPT | Performed by: INTERNAL MEDICINE

## 2022-04-14 PROCEDURE — 99232 SBSQ HOSP IP/OBS MODERATE 35: CPT | Mod: FS | Performed by: INTERNAL MEDICINE

## 2022-04-14 PROCEDURE — 83735 ASSAY OF MAGNESIUM: CPT

## 2022-04-14 PROCEDURE — RXMED WILLOW AMBULATORY MEDICATION CHARGE: Performed by: NURSE PRACTITIONER

## 2022-04-14 PROCEDURE — A9270 NON-COVERED ITEM OR SERVICE: HCPCS | Performed by: NURSE PRACTITIONER

## 2022-04-14 PROCEDURE — 99239 HOSP IP/OBS DSCHRG MGMT >30: CPT | Mod: GC | Performed by: HOSPITALIST

## 2022-04-14 PROCEDURE — 80053 COMPREHEN METABOLIC PANEL: CPT

## 2022-04-14 RX ORDER — LOSARTAN POTASSIUM 25 MG/1
25 TABLET ORAL EVERY EVENING
Qty: 30 TABLET | Refills: 11 | Status: SHIPPED | OUTPATIENT
Start: 2022-04-14

## 2022-04-14 RX ORDER — ROSUVASTATIN CALCIUM 20 MG/1
20 TABLET, COATED ORAL EVERY EVENING
Qty: 30 TABLET | Refills: 11 | Status: SHIPPED | OUTPATIENT
Start: 2022-04-14

## 2022-04-14 RX ORDER — METOPROLOL SUCCINATE 25 MG/1
25 TABLET, EXTENDED RELEASE ORAL DAILY
Qty: 30 TABLET | Refills: 11 | Status: SHIPPED | OUTPATIENT
Start: 2022-04-14

## 2022-04-14 RX ORDER — METOPROLOL SUCCINATE 25 MG/1
25 TABLET, EXTENDED RELEASE ORAL
Status: DISCONTINUED | OUTPATIENT
Start: 2022-04-14 | End: 2022-04-14 | Stop reason: HOSPADM

## 2022-04-14 RX ORDER — NITROGLYCERIN 0.4 MG/1
0.4 TABLET SUBLINGUAL PRN
Qty: 25 TABLET | Refills: 2 | Status: SHIPPED | OUTPATIENT
Start: 2022-04-14

## 2022-04-14 RX ORDER — PRASUGREL 10 MG/1
10 TABLET, FILM COATED ORAL DAILY
Qty: 30 TABLET | Refills: 11 | Status: SHIPPED | OUTPATIENT
Start: 2022-04-15

## 2022-04-14 RX ORDER — ASPIRIN 81 MG/1
81 TABLET ORAL DAILY
Qty: 30 TABLET | Refills: 11 | Status: SHIPPED | OUTPATIENT
Start: 2022-04-15

## 2022-04-14 RX ADMIN — METOPROLOL SUCCINATE 25 MG: 25 TABLET, EXTENDED RELEASE ORAL at 10:27

## 2022-04-14 RX ADMIN — ASPIRIN 81 MG: 81 TABLET, COATED ORAL at 06:01

## 2022-04-14 RX ADMIN — PRASUGREL 10 MG: 10 TABLET, FILM COATED ORAL at 06:01

## 2022-04-14 ASSESSMENT — ENCOUNTER SYMPTOMS
STRIDOR: 0
CHEST TIGHTNESS: 0
WHEEZING: 0
APNEA: 0
CHOKING: 0
COUGH: 0
SHORTNESS OF BREATH: 0

## 2022-04-14 NOTE — CARE PLAN
The patient is Watcher - Medium risk of patient condition declining or worsening    Shift Goals  Clinical Goals: Monitor for chest pain, VSS  Patient Goals: Rest  Family Goals: RJ. No family present    Progress made toward(s) clinical / shift goals:    Problem: Pain - Standard  Goal: Alleviation of pain or a reduction in pain to the patient’s comfort goal  Outcome: Progressing     Problem: Knowledge Deficit - Standard  Goal: Patient and family/care givers will demonstrate understanding of plan of care, disease process/condition, diagnostic tests and medications  Outcome: Progressing       Patient is not progressing towards the following goals: NA

## 2022-04-14 NOTE — PROGRESS NOTES
Pt arrived to discharge lounge via wheelchair with care aid. Pt has IV's removed. Pt given discharge instructions prior to leaving including prescriptions and when to visit with physician; verbalizes understanding. Personal belongings with pt when leaving. Copy of discharge instructions with pt and scanned in. Pt sitting up in chair awaiting ride and meds to beds.

## 2022-04-14 NOTE — PROCEDURES
"CARDIAC CATHETERIZATION REPORT    Referring Provider: Remberto Rueda M.D.    PROCEDURE PHYSICIAN: Delano Deleon MD, [facility], [facility]  ASSISTANT: None    IMPRESSIONS:  1. High risk NSTEMI due to occluded right PLB  2.  Successful PCI of posterior lateral branch of the vessel is diffusely diseased and has poor runoff  3. Patent stent in the circumflex system.  4.  Mild elevation LVEDP at 19 mmHg    Recommendations:  Integrilin infusion for 6 hours.  Dual antiplatelet therapy.    Pre-procedure diagnosis: NSTEMI  Post-procedure diagnosis: Same    Procedure performed  Selective coronary angiography  Left heart catheterization  Percutaneous coronary intervention - Stent Placement (Posterior lateral branch)  Intravascular Ultrasound (Posterior lateral branch)    Conscious sedation was supervised by myself and administered by trained personnel using fentanyl and versed between 1844 and 1922. The patient tolerated sedation without complication.     Procedure Description  1. Access: 5/6 Ni[place]an right radial artery Micropuncture technique was utilized following local anesthesia with lidocaine.  A radial cocktail containing verapamil and saline was administered in the radial artery sheath    2. Diagnostic description: The catheter was passed to the central circulation with the aide of J tipped 0.35\" wire. A 5F TIG 4.0 was used to inject the coronary circulation and enter the left ventricle during invasive hemodynamic monitoring.     3. Description of Intervention: After confirming therapeutic anticoagulation intervention proceeded. A 6F Ikari Right guiding catheter was used. The lesion in the PLB Right was crossed with a 0.014\"  50 wire.  The lesion was predilated with a 2.0 x 12 mm compliant balloon.  I then performed IVUS after administration of nitroglycerin which showed diffuse disease throughout the posterior lateral system and a reference vessel diameter of 3 mm.  I then deployed a 3.0 x 22 mm Ric drug-eluting " stent and postdilated this with a 3.0 x 12 mm NC balloon at 14 to 20 tyrone.  Angiographically the stent appeared oversized and I was suspicious for distal edge dissection.  I covered the distal edge with a 2.0 x 12 mm Ric drug-eluting stent and postdilated the overlap portion with a 2.5 x 12 mm NC balloon at nominal atmospheres.  Subsequent angiogram showed excellent result at the stent site but poor runoff vessel.  Further efforts at PCI were abandoned    4. Closing: At completion of the procedure the relevant equipment was removed from the body and hemostasis achieved by Radial band    Findings   Hemodynamics:   Aorta: 111/68 mmHg  LVEDP: 19 mmHg  No significant pullback gradient across the aortic valve    Coronary Anatomy   Left Main: Normal   LAD: Minimal luminal irregularities    Ramus: Proximal 30% stenosis.   LCx: Minimal luminal irregularities in the AV groove.  The first OM has a 50% proximal stenosis followed by a widely patent stent in the midportion.   RCA: Dominant, 30% stenosis proximally.  The PDA branch is high and is normal.  The posterior lateral branch is 100% occluded.     Results of intervention to the PLB Right:  Pre: 100% stenosis and ENDY 0 flow  Post: 0% residual stenosis and ENDY III flow. No dissection or distal embolization.    Technical Factors  1. Complications: None  2. Estimated Blood Loss: <50 cc  3. Specimens: None  4. Contrast Volume: 60 ml  5. Medications: Radial cocktail (Verapamil 2.5 mg, Nitroglycerin 100 mcg) Heparin to maintain ACT >250 Prasugrel 60 mg Integrillin double bolus Intracoronary nitroglycerin  6. Radiation (Air Kerma): 335 mGy

## 2022-04-14 NOTE — DISCHARGE SUMMARY
Discharge Summary    Date of Admission: 4/12/2022  Date of Discharge: 4/14/2022 11:53 AM  Discharging Attending: Dr. Connelly  Discharging Senior Resident: Dr. Baugh  Discharging Intern: Dr. Juarez    CHIEF COMPLAINT ON ADMISSION  Chief Complaint   Patient presents with   • Chest Pain       Reason for Admission  Chest pain    Admission Date  4/12/2022    CODE STATUS  Full Code    HPI & HOSPITAL COURSE  This is a 56 y.o. male with past medical history of coronary artery disease s/p PCI to LCx x 2015, hypertension, hyperlipidemia and long-term current tobacco abuse, who presented to the hospital on 04/12/2022 after having an episode of chest pain provoked by running after his dog.  On arrival, he complained of typical anginal chest pain which resolved with nitro.  He was found to be mildly bradycardic with PA in high 50s, SBP in 160s to 170s.  He was found to have elevated troponin of 100 with normal renal functions.  He was treated with aspirin, statin, heparin bolus, nitro and morphine and cardiology was consulted.  Troponin peaked at 902.  He had coronary angiogram on 04/13 with successful PCI of posterior lateral branch of RCA, it also showed patent stent in circumflex system.  Echocardiogram showed normal left ventricular ejection fraction of 55% with mildly dilated left atrium without any valvular abnormalities.  He is started on dual antiplatelet therapy aspirin and prasugrel along with Crestor, metoprolol XL and losartan.  He did not have any more episodes of chest pain or other cardiopulmonary symptoms during hospitalization and he remained hemodynamically stable.  He is advised for lifelong abstinence from tobacco.  He is discharged to home on above-mentioned medications and properly counseled on importance of dual antiplatelet therapy along with other medications.    Therefore, he is discharged in good and stable condition to home with close outpatient follow-up    The patient met 2-midnight criteria for  an inpatient stay at the time of discharge.    PHYSICAL EXAM ON DISCHARGE  Temp:  [36.1 °C (97 °F)-37.1 °C (98.7 °F)] 37.1 °C (98.7 °F)  Pulse:  [56-67] 64  Resp:  [17-18] 18  BP: (105-131)/(71-86) 122/86  SpO2:  [90 %-97 %] 96 %      Physical Exam  Constitutional:       Appearance: Normal appearance. He is not ill-appearing.   HENT:      Head: Normocephalic and atraumatic.      Nose: Nose normal. No congestion or rhinorrhea.      Mouth/Throat:      Mouth: Mucous membranes are moist.      Pharynx: Oropharynx is clear. No oropharyngeal exudate or posterior oropharyngeal erythema.   Eyes:      Conjunctiva/sclera: Conjunctivae normal.      Pupils: Pupils are equal, round, and reactive to light.   Cardiovascular:      Rate and Rhythm: Normal rate and regular rhythm.      Pulses: Normal pulses.      Heart sounds: Normal heart sounds. No murmur heard.  Pulmonary:      Effort: Pulmonary effort is normal. No respiratory distress.      Breath sounds: Normal breath sounds. No wheezing or rales.   Abdominal:      General: Abdomen is flat. Bowel sounds are normal. There is no distension.      Palpations: Abdomen is soft.      Tenderness: There is no abdominal tenderness. There is no guarding.   Musculoskeletal:         General: No swelling or tenderness. Normal range of motion.   Skin:     General: Skin is warm.      Coloration: Skin is not jaundiced or pale.   Neurological:      General: No focal deficit present.      Mental Status: He is alert and oriented to person, place, and time.   Psychiatric:         Mood and Affect: Mood normal.         Behavior: Behavior normal.       Discharge Date  4/14/2022    FOLLOW UP ITEMS POST DISCHARGE  -Follow-up with cardiology clinic.  -Follow-up with primary care physician for other chronic medical problems.  With past medical history of coronary artery disease    DISCHARGE DIAGNOSES  Principal Problem:    NSTEMI (non-ST elevated myocardial infarction) (HCC) POA: Yes  Active Problems:     Leukocytosis POA: Unknown    Dyslipidemia POA: Unknown    Tobacco abuse POA: Unknown    Alcohol abuse POA: Unknown    Non compliance with medical treatment POA: Unknown  Resolved Problems:    * No resolved hospital problems. *      FOLLOW UP  No future appointments.  [facility]  [address]  [address]  [facility] [id number] [phone]  Call  Your doctor has referred you for Cardiac rehab which is important in your recovery. Please call to make an appointment or speak with your local doctor for programs in your area.    [facility] ([facility] - [facility]  [address] [id number] [phone]  Call  Please call [facility] to establish with a Primary Care Provider. This office offers sliding fee scales based on income. Thank you.    [facility]  [address]  [facility] [address] [phone]  Call  Please call [facility] to be seen by a cardiologist. This department accepts self pay and a payment plan.     [address] [address] [phone]  Call  Please call [facility] to establish with a primary provider and cardiologist. Thank you.       MEDICATIONS ON DISCHARGE     Medication List      START taking these medications      Instructions   Aspirin Low Dose 81 MG EC tablet  Start taking on: April 15, 2022  Generic drug: aspirin   Take 1 Tablet by mouth every day.  Dose: 81 mg     losartan 25 MG Tabs  Commonly known as: COZAAR   Take 1 Tablet by mouth every evening.  Dose: 25 mg     metoprolol SR 25 MG Tb24  Commonly known as: TOPROL XL   Take 1 Tablet by mouth every day.  Dose: 25 mg     nitroglycerin 0.4 MG Subl  Commonly known as: NITROSTAT   Place 1 Tablet under the tongue as needed for Chest Pain (up to 3 doses (if SBP greater than 90 mmHg)).  Dose: 0.4 mg     prasugrel 10 MG Tabs  Start taking on: April 15,  2022  Commonly known as: EFFIENT   Take 1 Tablet by mouth every day.  Dose: 10 mg     rosuvastatin 20 MG Tabs  Commonly known as: CRESTOR   Take 1 Tablet by mouth every evening.  Dose: 20 mg        STOP taking these medications    ibuprofen 600 MG Tabs  Commonly known as: MOTRIN            Allergies  No Known Allergies    DIET  Orders Placed This Encounter   Procedures   • Diet Order Diet: Cardiac     Standing Status:   Standing     Number of Occurrences:   1     Order Specific Question:   Diet:     Answer:   Cardiac [6]       ACTIVITY  As tolerated.  Weight bearing as tolerated    CONSULTATIONS  Dr. Rajan with Cardiology consulted.  Treatment options were discussed and plan of care agreed upon.    PROCEDURES  Coronary Angiogram with successful PCI of posterior lateral branch of RCA on 04/13/2022.    Time spent for discharge 38 minutes.

## 2022-04-14 NOTE — CARE PLAN
The patient is Watcher - Medium risk of patient condition declining or worsening    Shift Goals  Clinical Goals: Monitor cath site  Patient Goals: Rest, dinner  Family Goals: N/A    Progress made toward(s) clinical / shift goals: POC/medications discussed with pt. Discussed patient about procedure, directions about using R wrist, medications, and discharge planning. Reported no chest pain after procedure. Pt reported of back pain from laying in bed for long periods of time. Medicated per MAR as needed.    Problem: Knowledge Deficit - Standard  Goal: Patient and family/care givers will demonstrate understanding of plan of care, disease process/condition, diagnostic tests and medications  Outcome: Progressing     Problem: Pain - Standard  Goal: Alleviation of pain or a reduction in pain to the patient’s comfort goal  Outcome: Progressing

## 2022-04-14 NOTE — DISCHARGE INSTRUCTIONS
-Take all your medications as instructed, especially ASPIRIN and PRASUGREL for your stent to prevent occlusion.  -Follow up with Cardio and PCP    Discharge Instructions    Discharged to home by car with relative. Discharged via wheelchair, hospital escort: Yes.  Special equipment needed: Not Applicable    Be sure to schedule a follow-up appointment with your primary care doctor or any specialists as instructed.     Discharge Plan:   Diet Plan: Discussed  Activity Level: Discussed  Confirmed Follow up Appointment: Patient to Call and Schedule Appointment  Confirmed Symptoms Management: Discussed  Medication Reconciliation Updated: Yes    I understand that a diet low in cholesterol, fat, and sodium is recommended for good health. Unless I have been given specific instructions below for another diet, I accept this instruction as my diet prescription.   Other diet: Regular    Special Instructions:    · Is patient discharged on Warfarin / Coumadin?   No       Heart Attack  A heart attack occurs when blood and oxygen supply to the heart is cut off. A heart attack causes damage to the heart that cannot be fixed. A heart attack is also called a myocardial infarction, or MI. If you think you are having a heart attack, do not wait to see if the symptoms will go away. Get medical help right away.  What are the causes?  This condition may be caused by:  · A fatty substance (plaque) in the blood vessels (arteries). This can block the flow of blood to the heart.  · A blood clot in the blood vessels that go to the heart. The blood clot blocks blood flow.  · Low blood pressure.  · An abnormal heartbeat.  · Some diseases, such as problems in red blood cells (anemia)orproblems in breathing (respiratory failure).  · Tightening (spasm) of a blood vessel that cuts off blood to the heart.  · A tear in a blood vessel of the heart.  · High blood pressure.  What increases the risk?  The following factors may make you more likely to develop  this condition:  · Aging. The older you are, the higher your risk.  · Having a personal or family history of chest pain, heart attack, stroke, or narrowing of the arteries in the legs, arms, head, or stomach (peripheral artery disease).  · Being male.  · Smoking.  · Not getting regular exercise.  · Being overweight or obese.  · Having high blood pressure.  · Having high cholesterol.  · Having diabetes.  · Drinking too much alcohol.  · Using illegal drugs, such as cocaine or methamphetamine.  What are the signs or symptoms?  Symptoms of this condition include:  · Chest pain. It may feel like:  ? Crushing or squeezing.  ? Tightness, pressure, fullness, or heaviness.  · Pain in the arm, neck, jaw, back, or upper body.  · Shortness of breath.  · Heartburn.  · Upset stomach (indigestion).  · Feeling like you may vomit (nauseous).  · Cold sweats.  · Feeling tired.  · Sudden light-headedness.  How is this treated?  A heart attack must be treated as soon as possible. Treatment may include:  · Medicines to:  ? Break up or dissolve blood clots.  ? Thin blood and help prevent blood clots.  ? Treat blood pressure.  ? Improve blood flow to the heart.  ? Reduce pain.  ? Reduce cholesterol.  · Procedures to widen a blocked artery and keep it open.  · Open heart surgery.  · Receiving oxygen.  · Making your heart strong again (cardiac rehabilitation) through exercise, education, and counseling.  Follow these instructions at home:  Medicines  · Take over-the-counter and prescription medicines only as told by your doctor. You may need to take medicine:  ? To keep your blood from clotting too easily.  ? To control blood pressure.  ? To lower cholesterol.  ? To control heart rhythms.  · Do not take these medicines unless your doctor says it is okay:  ? NSAIDs, such as ibuprofen.  ? Supplements that have vitamin A, vitamin E, or both.  ? Hormone replacement therapy that has estrogen with or without progestin.  Lifestyle         · Do not  use any products that have nicotine or tobacco, such as cigarettes, e-cigarettes, and chewing tobacco. If you need help quitting, ask your doctor.  · Avoid secondhand smoke.  · Exercise regularly. Ask your doctor about a cardiac rehab program.  · Eat heart-healthy foods. Your doctor will tell you what foods to eat.  · Stay at a healthy weight.  · Lower your stress level.  · Do not use illegal drugs.  Alcohol use  · Do not drink alcohol if:  ? Your doctor tells you not to drink.  ? You are pregnant, may be pregnant, or are planning to become pregnant.  · If you drink alcohol:  ? Limit how much you use to:  § 0-1 drink a day for women.  § 0-2 drinks a day for men.  ? Know how much alcohol is in your drink. In the [place] one drink equals one 12 oz bottle of beer (355 mL), one 5 oz glass of wine (148 mL), or one 1½ oz glass of hard liquor (44 mL).  General instructions  · Work with your doctor to treat other problems you may have, such as diabetes or high blood pressure.  · Get screened for depression. Get treatment if needed.  · Keep your vaccines up to date. Get the flu shot (influenza vaccine) every year.  · Keep all follow-up visits as told by your doctor. This is important.  Contact a doctor if:  · You feel very sad.  · You have trouble doing your daily activities.  Get help right away if:  · You have sudden, unexplained discomfort in your chest, arms, back, neck, jaw, or upper body.  · You have shortness of breath.  · You have sudden sweating or clammy skin.  · You feel like you may vomit.  · You vomit.  · You feel tired or weak.  · You get light-headed or dizzy.  · You feel your heart beating fast.  · You feel your heart skipping beats.  · You have blood pressure that is higher than 180/120.  These symptoms may be an emergency. Do not wait to see if the symptoms will go away. Get medical help right away. Call your local emergency services (911 in the [place] Do not drive yourself to the hospital.  Summary  · A  heart attack occurs when blood and oxygen supply to the heart is cut off.  · Do not take NSAIDs unless your doctor says it is okay.  · Do not smoke. Avoid secondhand smoke.  · Exercise regularly. Ask your doctor about a cardiac rehab program.  This information is not intended to replace advice given to you by your health care provider. Make sure you discuss any questions you have with your health care provider.  Document Released: 06/18/2013 Document Revised: 03/30/2020 Document Reviewed: 03/30/2020  Elsevier Patient Education © 2020 [employer]      Depression / Suicide Risk    As you are discharged from this [facility] facility, it is important to learn how to keep safe from harming yourself.    Recognize the warning signs:  · Abrupt changes in personality, positive or negative- including increase in energy   · Giving away possessions  · Change in eating patterns- significant weight changes-  positive or negative  · Change in sleeping patterns- unable to sleep or sleeping all the time   · Unwillingness or inability to communicate  · Depression  · Unusual sadness, discouragement and loneliness  · Talk of wanting to die  · Neglect of personal appearance   · Rebelliousness- reckless behavior  · Withdrawal from people/activities they love  · Confusion- inability to concentrate     If you or a loved one observes any of these behaviors or has concerns about self-harm, here's what you can do:  · Talk about it- your feelings and reasons for harming yourself  · Remove any means that you might use to hurt yourself (examples: pills, rope, extension cords, firearm)  · Get professional help from the community (Mental Health, Substance Abuse, psychological counseling)  · Do not be alone:Call your Safe Contact- someone whom you trust who will be there for you.  · Call your local CRISIS HOTLINE 603-8072 or 264-927-0649  · Call your local Children's Mobile Crisis Response Team [place] (064) 890-3651 or  www.[employer].Finicity  · Call the toll free National Suicide Prevention Hotlines   · National Suicide Prevention Lifeline 842-196-DYTI (6680)  · National Hope Line Network 800-SUICIDE (839-2241)

## 2022-04-14 NOTE — PROGRESS NOTES
Cardiology Follow Up Progress Note    Date of Service  4/14/2022    Attending Physician  PARAG Connelly M.D.    Chief Complaint   Chest pain    HPI  Joe Corrales is a 56 y.o. male admitted 4/12/2022 with recurrent chest pain after further evaluation in ER found to have NSTEMI.    PMH: CAD with MI s/p PCI LCx 2015 (s/p thrombolytic therapy), hypertension, hyperlipidemia, active tobacco use, positive family history of MI in father at age 60, noncompliant with medical therapy.    Interim Events  No overnight cardiac events.  BP appropriate.    Underwent successful PCI to posterolateral branch artery 4/13. Patent LCX.  Discussed the  importance of adherence to medical therapy specifically DAPT in the form of aspirin and Effient along with Crestor, Toprol-XL and losartan.    Review of Systems  Review of Systems   Respiratory: Negative for apnea, cough, choking, chest tightness, shortness of breath, wheezing and stridor.        Vital signs in last 24 hours  Temp:  [36.1 °C (97 °F)-37.1 °C (98.7 °F)] 37.1 °C (98.7 °F)  Pulse:  [56-67] 64  Resp:  [17-18] 18  BP: (105-131)/(71-86) 122/86  SpO2:  [90 %-97 %] 96 %    Physical Exam  Physical Exam  Cardiovascular:      Rate and Rhythm: Bradycardia present.      Pulses: Normal pulses.   Pulmonary:      Effort: Pulmonary effort is normal.   Skin:     General: Skin is warm.      Comments: Right radial cath site without evidence of hematoma   Neurological:      Mental Status: He is alert. Mental status is at baseline.   Psychiatric:         Mood and Affect: Mood normal.         Lab Review  Lab Results   Component Value Date/Time    WBC 10.3 04/14/2022 02:16 AM    RBC 5.04 04/14/2022 02:16 AM    HEMOGLOBIN 15.2 04/14/2022 02:16 AM    HEMATOCRIT 44.4 04/14/2022 02:16 AM    MCV 88.1 04/14/2022 02:16 AM    MCH 30.2 04/14/2022 02:16 AM    MCHC 34.2 04/14/2022 02:16 AM    MPV 10.9 04/14/2022 02:16 AM      Lab Results   Component Value Date/Time    SODIUM 135 04/14/2022 02:16 AM     POTASSIUM 4.0 04/14/2022 02:16 AM    CHLORIDE 104 04/14/2022 02:16 AM    CO2 21 04/14/2022 02:16 AM    GLUCOSE 104 (H) 04/14/2022 02:16 AM    BUN 10 04/14/2022 02:16 AM    CREATININE 0.60 04/14/2022 02:16 AM      Lab Results   Component Value Date/Time    ASTSGOT 213 (H) 04/14/2022 02:16 AM    ALTSGPT 51 (H) 04/14/2022 02:16 AM     Lab Results   Component Value Date/Time    CHOLSTRLTOT 164 04/13/2022 06:39 AM    LDL 90 04/13/2022 06:39 AM    HDL 39 (A) 04/13/2022 06:39 AM    TRIGLYCERIDE 175 (H) 04/13/2022 06:39 AM    TROPONINT 902 (H) 04/13/2022 06:39 AM       No results for input(s): NTPROBNP in the last 72 hours.    Cardiac Imaging and Procedures Review  EKG: Sinus bradycardia    Echocardiogram: 4/13/2022  Normal right and left ventricular size and function.   The left ventricular ejection fraction is visually estimated to be 55%.  Indeterminate diastolic function.  Mildly dilated left atrium.  No evidence of valvular abnormality based on Doppler evaluation.   Right heart pressures are normal.      Compared to the previous echocardiogram performed on 07/30/2015: there   has been no significant change.          Cardiac Catheterization: 4/13/2022  IMPRESSIONS:  1. High risk NSTEMI due to occluded right PLB  2.  Successful PCI of posterior lateral branch of the vessel is diffusely diseased and has poor runoff  3. Patent stent in the circumflex system.  4.  Mild elevation LVEDP at 19 mmHg    Coronary Anatomy              Left Main: Normal              LAD: Minimal luminal irregularities               Ramus: Proximal 30% stenosis.              LCx: Minimal luminal irregularities in the AV groove.  The first OM has a 50% proximal stenosis followed by a widely patent stent in the midportion.              RCA: Dominant, 30% stenosis proximally.  The PDA branch is high and is normal.  The posterior lateral branch is 100% occluded.                    Imaging  Chest X-Ray: Not applicable    Stress Test: Not  applicable    Assessment/Plan    #NSTEMI  #Known CAD s/p  LCx 2015  #Hypertension  #Hyperlipidemia  #Active tobacco use  #Alcohol use  # LVEDP 19 mmHg based on cath 4/13.  Plan  # s/p successful PCI to posterior lateral branch artery 4/13/2022.  #Continue with DAPT with aspirin 81 mg and Effient 10 mg, discussed adherence in detail.  #Continue with Crestor 20 mg.  #Continue with Toprol-XL 25 mg.  #BP well controlled, on losartan 25 mg.  #Lifelong abstinence from tobacco and alcohol use.  #Referral to cardiac rehab if available in management.  #Patient states he will follow-up with cardiologist in [place].  #Meds to bed for all cardiac meds were ordered.    No further cardiac work-up.  Cardiology will sign off      I personally spent a total of 12 minutes which includes face-to-face time and non-face-to-face time spent on preparing to see the patient, reviewing hospital notes and tests, obtaining history from the patient, performing a medically appropriate exam, counseling and educating the patient, ordering medications/tests/procedures/referrals as clinically indicated, and documenting information in the electronic medical record.      Thank you for allowing me to participate in the care of this patient.      Please contact me with any questions.    BOBBY Payne.   Cardiologist, [facility]  [phone]

## 2022-04-14 NOTE — PROGRESS NOTES
Pt A/Ox4, RA, VSS, no chest pain, no SOB, no numbness or tingling on R hand and fingers. +2 radial pulse on RUE and fast <3 sec cap refill on RUE. +2 bounding pulses in all extremities. Released all air. Hemoband removed. Placed gauze and tegaderm as dressing. No drainage or output are idenified. No bleeding.

## 2022-04-14 NOTE — PROGRESS NOTES
Pt A/Ox4, RA, VSS, no chest pain, no SOB, no numbness or tingling on R hand and fingers. +2 radial pulse on RUE. Released 3ml of air. No sign of bleeding and no changes.

## 2022-05-02 NOTE — PROGRESS NOTES
1650 pt. Complaining of chest pain/pressure 3/10. /86, HR 56 satting at 92% RA. Stat EKG ordered, nitro sublingual given. 2 L 02 via NC placed on pt.     1655 Pt. States chest pain/pressure is the same 3/10. Repeat /79 HR 64 satting at 93%. 2nd dose of sublingual nitro given.     1700 Pt. States the nitro has not relieved chest pain/pressure, still 3/10. Repeat /77 HR 67 satting at 97%. Pt. Declines 3rd nitro, states its giving him a headache.     Dr. Dumont from cardiology notified.    I sent a Rx to the pharmacy for her for sleep.  She needs to take it 30-40 minutes prior to when she wants to fall asleep.    Electronically signed by:  Fernando Fonseca M.D.  2/2/2021     Home oral

## 2023-12-08 NOTE — ASSESSMENT & PLAN NOTE
Heart scan results and recommendations have been reviewed with patient by provider, cardiac care coordinator will not call patient at this time. Results letter and risk factor educational material sent to patient.     Total cholesterol: 164  LDL: 90  HDL: 39  Patient not on any statin due to noncompliance with medications.  High risk for CAD.  -Continue rosuvastatin.

## 2024-05-24 ENCOUNTER — APPOINTMENT (OUTPATIENT)
Dept: RADIOLOGY | Facility: MEDICAL CENTER | Age: 59
DRG: 322 | End: 2024-05-24
Attending: EMERGENCY MEDICINE
Payer: OTHER MISCELLANEOUS

## 2024-05-24 ENCOUNTER — HOSPITAL ENCOUNTER (INPATIENT)
Facility: MEDICAL CENTER | Age: 59
LOS: 1 days | DRG: 322 | End: 2024-05-25
Attending: EMERGENCY MEDICINE | Admitting: HOSPITALIST
Payer: OTHER MISCELLANEOUS

## 2024-05-24 ENCOUNTER — APPOINTMENT (OUTPATIENT)
Dept: CARDIOLOGY | Facility: MEDICAL CENTER | Age: 59
DRG: 322 | End: 2024-05-24
Attending: INTERNAL MEDICINE
Payer: OTHER MISCELLANEOUS

## 2024-05-24 DIAGNOSIS — R79.89 ELEVATED TROPONIN: ICD-10-CM

## 2024-05-24 DIAGNOSIS — I21.4 NSTEMI (NON-ST ELEVATED MYOCARDIAL INFARCTION) (HCC): ICD-10-CM

## 2024-05-24 DIAGNOSIS — R07.9 ACUTE CHEST PAIN: ICD-10-CM

## 2024-05-24 DIAGNOSIS — I21.19 INFERIOR MYOCARDIAL INFARCTION (HCC): ICD-10-CM

## 2024-05-24 PROBLEM — R07.89 ATYPICAL CHEST PAIN: Status: ACTIVE | Noted: 2024-05-24

## 2024-05-24 LAB
ALBUMIN SERPL BCP-MCNC: 4 G/DL (ref 3.2–4.9)
ALBUMIN/GLOB SERPL: 1.4 G/DL
ALP SERPL-CCNC: 98 U/L (ref 30–99)
ALT SERPL-CCNC: 32 U/L (ref 2–50)
AMPHET UR QL SCN: NEGATIVE
ANION GAP SERPL CALC-SCNC: 10 MMOL/L (ref 7–16)
APTT PPP: 28.1 SEC (ref 24.7–36)
AST SERPL-CCNC: 34 U/L (ref 12–45)
BARBITURATES UR QL SCN: NEGATIVE
BASOPHILS # BLD AUTO: 0.2 % (ref 0–1.8)
BASOPHILS # BLD: 0.02 K/UL (ref 0–0.12)
BENZODIAZ UR QL SCN: NEGATIVE
BILIRUB SERPL-MCNC: 0.7 MG/DL (ref 0.1–1.5)
BUN SERPL-MCNC: 16 MG/DL (ref 8–22)
BZE UR QL SCN: NEGATIVE
CALCIUM ALBUM COR SERPL-MCNC: 8.9 MG/DL (ref 8.5–10.5)
CALCIUM SERPL-MCNC: 8.9 MG/DL (ref 8.5–10.5)
CANNABINOIDS UR QL SCN: NEGATIVE
CHLORIDE SERPL-SCNC: 105 MMOL/L (ref 96–112)
CO2 SERPL-SCNC: 23 MMOL/L (ref 20–33)
CREAT SERPL-MCNC: 0.78 MG/DL (ref 0.5–1.4)
EKG IMPRESSION: NORMAL
EOSINOPHIL # BLD AUTO: 0.1 K/UL (ref 0–0.51)
EOSINOPHIL NFR BLD: 1.2 % (ref 0–6.9)
ERYTHROCYTE [DISTWIDTH] IN BLOOD BY AUTOMATED COUNT: 41.6 FL (ref 35.9–50)
FENTANYL UR QL: NEGATIVE
GFR SERPLBLD CREATININE-BSD FMLA CKD-EPI: 104 ML/MIN/1.73 M 2
GLOBULIN SER CALC-MCNC: 2.9 G/DL (ref 1.9–3.5)
GLUCOSE SERPL-MCNC: 101 MG/DL (ref 65–99)
HCT VFR BLD AUTO: 48.2 % (ref 42–52)
HGB BLD-MCNC: 16.4 G/DL (ref 14–18)
IMM GRANULOCYTES # BLD AUTO: 0.06 K/UL (ref 0–0.11)
IMM GRANULOCYTES NFR BLD AUTO: 0.7 % (ref 0–0.9)
INR PPP: 0.96 (ref 0.87–1.13)
LIPASE SERPL-CCNC: 26 U/L (ref 11–82)
LYMPHOCYTES # BLD AUTO: 1.53 K/UL (ref 1–4.8)
LYMPHOCYTES NFR BLD: 18.4 % (ref 22–41)
MCH RBC QN AUTO: 28.7 PG (ref 27–33)
MCHC RBC AUTO-ENTMCNC: 34 G/DL (ref 32.3–36.5)
MCV RBC AUTO: 84.3 FL (ref 81.4–97.8)
METHADONE UR QL SCN: NEGATIVE
MONOCYTES # BLD AUTO: 0.46 K/UL (ref 0–0.85)
MONOCYTES NFR BLD AUTO: 5.5 % (ref 0–13.4)
NEUTROPHILS # BLD AUTO: 6.15 K/UL (ref 1.82–7.42)
NEUTROPHILS NFR BLD: 74 % (ref 44–72)
NRBC # BLD AUTO: 0 K/UL
NRBC BLD-RTO: 0 /100 WBC (ref 0–0.2)
OPIATES UR QL SCN: NEGATIVE
OXYCODONE UR QL SCN: NEGATIVE
PCP UR QL SCN: NEGATIVE
PLATELET # BLD AUTO: 209 K/UL (ref 164–446)
PMV BLD AUTO: 10.6 FL (ref 9–12.9)
POTASSIUM SERPL-SCNC: 4.4 MMOL/L (ref 3.6–5.5)
PROPOXYPH UR QL SCN: NEGATIVE
PROT SERPL-MCNC: 6.9 G/DL (ref 6–8.2)
PROTHROMBIN TIME: 12.9 SEC (ref 12–14.6)
RBC # BLD AUTO: 5.72 M/UL (ref 4.7–6.1)
SODIUM SERPL-SCNC: 138 MMOL/L (ref 135–145)
TROPONIN T SERPL-MCNC: 110 NG/L (ref 6–19)
TROPONIN T SERPL-MCNC: 315 NG/L (ref 6–19)
TROPONIN T SERPL-MCNC: 812 NG/L (ref 6–19)
UFH PPP CHRO-ACNC: <0.1 IU/ML
WBC # BLD AUTO: 8.3 K/UL (ref 4.8–10.8)

## 2024-05-24 PROCEDURE — 80053 COMPREHEN METABOLIC PANEL: CPT

## 2024-05-24 PROCEDURE — 700102 HCHG RX REV CODE 250 W/ 637 OVERRIDE(OP)

## 2024-05-24 PROCEDURE — 700102 HCHG RX REV CODE 250 W/ 637 OVERRIDE(OP): Performed by: HOSPITALIST

## 2024-05-24 PROCEDURE — 93005 ELECTROCARDIOGRAM TRACING: CPT

## 2024-05-24 PROCEDURE — A9270 NON-COVERED ITEM OR SERVICE: HCPCS

## 2024-05-24 PROCEDURE — C1887 CATHETER, GUIDING: HCPCS

## 2024-05-24 PROCEDURE — 700111 HCHG RX REV CODE 636 W/ 250 OVERRIDE (IP)

## 2024-05-24 PROCEDURE — 99255 IP/OBS CONSLTJ NEW/EST HI 80: CPT | Mod: 25,GC | Performed by: INTERNAL MEDICINE

## 2024-05-24 PROCEDURE — 92978 ENDOLUMINL IVUS OCT C 1ST: CPT | Mod: 26,LC | Performed by: INTERNAL MEDICINE

## 2024-05-24 PROCEDURE — B2151ZZ FLUOROSCOPY OF LEFT HEART USING LOW OSMOLAR CONTRAST: ICD-10-PCS | Performed by: INTERNAL MEDICINE

## 2024-05-24 PROCEDURE — 85520 HEPARIN ASSAY: CPT

## 2024-05-24 PROCEDURE — 93005 ELECTROCARDIOGRAM TRACING: CPT | Performed by: INTERNAL MEDICINE

## 2024-05-24 PROCEDURE — 93458 L HRT ARTERY/VENTRICLE ANGIO: CPT | Mod: 26,59 | Performed by: INTERNAL MEDICINE

## 2024-05-24 PROCEDURE — 770020 HCHG ROOM/CARE - TELE (206)

## 2024-05-24 PROCEDURE — 700111 HCHG RX REV CODE 636 W/ 250 OVERRIDE (IP): Mod: JZ | Performed by: INTERNAL MEDICINE

## 2024-05-24 PROCEDURE — 700101 HCHG RX REV CODE 250

## 2024-05-24 PROCEDURE — 700102 HCHG RX REV CODE 250 W/ 637 OVERRIDE(OP): Performed by: INTERNAL MEDICINE

## 2024-05-24 PROCEDURE — 85730 THROMBOPLASTIN TIME PARTIAL: CPT

## 2024-05-24 PROCEDURE — 99223 1ST HOSP IP/OBS HIGH 75: CPT | Mod: 25 | Performed by: INTERNAL MEDICINE

## 2024-05-24 PROCEDURE — 80307 DRUG TEST PRSMV CHEM ANLYZR: CPT

## 2024-05-24 PROCEDURE — 96365 THER/PROPH/DIAG IV INF INIT: CPT

## 2024-05-24 PROCEDURE — 85610 PROTHROMBIN TIME: CPT

## 2024-05-24 PROCEDURE — 99152 MOD SED SAME PHYS/QHP 5/>YRS: CPT | Performed by: INTERNAL MEDICINE

## 2024-05-24 PROCEDURE — 92928 PRQ TCAT PLMT NTRAC ST 1 LES: CPT | Mod: LC | Performed by: INTERNAL MEDICINE

## 2024-05-24 PROCEDURE — 71045 X-RAY EXAM CHEST 1 VIEW: CPT

## 2024-05-24 PROCEDURE — 96375 TX/PRO/DX INJ NEW DRUG ADDON: CPT

## 2024-05-24 PROCEDURE — 85025 COMPLETE CBC W/AUTO DIFF WBC: CPT

## 2024-05-24 PROCEDURE — 4A023N7 MEASUREMENT OF CARDIAC SAMPLING AND PRESSURE, LEFT HEART, PERCUTANEOUS APPROACH: ICD-10-PCS | Performed by: INTERNAL MEDICINE

## 2024-05-24 PROCEDURE — 93010 ELECTROCARDIOGRAM REPORT: CPT | Mod: 77 | Performed by: INTERNAL MEDICINE

## 2024-05-24 PROCEDURE — B240ZZ3 ULTRASONOGRAPHY OF SINGLE CORONARY ARTERY, INTRAVASCULAR: ICD-10-PCS | Performed by: INTERNAL MEDICINE

## 2024-05-24 PROCEDURE — 99291 CRITICAL CARE FIRST HOUR: CPT | Performed by: HOSPITALIST

## 2024-05-24 PROCEDURE — 93005 ELECTROCARDIOGRAM TRACING: CPT | Performed by: EMERGENCY MEDICINE

## 2024-05-24 PROCEDURE — A9270 NON-COVERED ITEM OR SERVICE: HCPCS | Performed by: INTERNAL MEDICINE

## 2024-05-24 PROCEDURE — 36415 COLL VENOUS BLD VENIPUNCTURE: CPT

## 2024-05-24 PROCEDURE — 83690 ASSAY OF LIPASE: CPT

## 2024-05-24 PROCEDURE — 93010 ELECTROCARDIOGRAM REPORT: CPT | Performed by: INTERNAL MEDICINE

## 2024-05-24 PROCEDURE — A9270 NON-COVERED ITEM OR SERVICE: HCPCS | Performed by: HOSPITALIST

## 2024-05-24 PROCEDURE — 99285 EMERGENCY DEPT VISIT HI MDM: CPT

## 2024-05-24 PROCEDURE — 700117 HCHG RX CONTRAST REV CODE 255: Performed by: INTERNAL MEDICINE

## 2024-05-24 PROCEDURE — 96366 THER/PROPH/DIAG IV INF ADDON: CPT

## 2024-05-24 PROCEDURE — 027034Z DILATION OF CORONARY ARTERY, ONE ARTERY WITH DRUG-ELUTING INTRALUMINAL DEVICE, PERCUTANEOUS APPROACH: ICD-10-PCS | Performed by: INTERNAL MEDICINE

## 2024-05-24 PROCEDURE — 700101 HCHG RX REV CODE 250: Performed by: HOSPITALIST

## 2024-05-24 PROCEDURE — 700111 HCHG RX REV CODE 636 W/ 250 OVERRIDE (IP): Performed by: EMERGENCY MEDICINE

## 2024-05-24 PROCEDURE — B2111ZZ FLUOROSCOPY OF MULTIPLE CORONARY ARTERIES USING LOW OSMOLAR CONTRAST: ICD-10-PCS | Performed by: INTERNAL MEDICINE

## 2024-05-24 PROCEDURE — 84484 ASSAY OF TROPONIN QUANT: CPT

## 2024-05-24 PROCEDURE — 700105 HCHG RX REV CODE 258: Performed by: INTERNAL MEDICINE

## 2024-05-24 RX ORDER — ASPIRIN 81 MG/1
81 TABLET ORAL DAILY
Status: DISCONTINUED | OUTPATIENT
Start: 2024-05-25 | End: 2024-05-24

## 2024-05-24 RX ORDER — PROCHLORPERAZINE EDISYLATE 5 MG/ML
5-10 INJECTION INTRAMUSCULAR; INTRAVENOUS EVERY 4 HOURS PRN
Status: DISCONTINUED | OUTPATIENT
Start: 2024-05-24 | End: 2024-05-25 | Stop reason: HOSPADM

## 2024-05-24 RX ORDER — MIDAZOLAM HYDROCHLORIDE 1 MG/ML
INJECTION INTRAMUSCULAR; INTRAVENOUS
Status: COMPLETED
Start: 2024-05-24 | End: 2024-05-24

## 2024-05-24 RX ORDER — METOPROLOL TARTRATE 1 MG/ML
5 INJECTION, SOLUTION INTRAVENOUS ONCE
Status: COMPLETED | OUTPATIENT
Start: 2024-05-24 | End: 2024-05-24

## 2024-05-24 RX ORDER — PRASUGREL 10 MG/1
10 TABLET, FILM COATED ORAL DAILY
Status: DISCONTINUED | OUTPATIENT
Start: 2024-05-25 | End: 2024-05-25 | Stop reason: HOSPADM

## 2024-05-24 RX ORDER — ACETAMINOPHEN 325 MG/1
650 TABLET ORAL EVERY 6 HOURS PRN
Status: DISCONTINUED | OUTPATIENT
Start: 2024-05-24 | End: 2024-05-25 | Stop reason: HOSPADM

## 2024-05-24 RX ORDER — LOSARTAN POTASSIUM 25 MG/1
25 TABLET ORAL EVERY EVENING
Status: DISCONTINUED | OUTPATIENT
Start: 2024-05-24 | End: 2024-05-25 | Stop reason: HOSPADM

## 2024-05-24 RX ORDER — ASPIRIN 325 MG
325 TABLET ORAL ONCE
Status: DISCONTINUED | OUTPATIENT
Start: 2024-05-24 | End: 2024-05-24

## 2024-05-24 RX ORDER — PRASUGREL 10 MG/1
10 TABLET, FILM COATED ORAL DAILY
Status: DISCONTINUED | OUTPATIENT
Start: 2024-05-24 | End: 2024-05-24

## 2024-05-24 RX ORDER — HEPARIN SODIUM 1000 [USP'U]/ML
4000 INJECTION, SOLUTION INTRAVENOUS; SUBCUTANEOUS ONCE
Status: COMPLETED | OUTPATIENT
Start: 2024-05-24 | End: 2024-05-24

## 2024-05-24 RX ORDER — PRASUGREL 10 MG/1
TABLET, FILM COATED ORAL
Status: COMPLETED
Start: 2024-05-24 | End: 2024-05-24

## 2024-05-24 RX ORDER — POLYETHYLENE GLYCOL 3350 17 G/17G
1 POWDER, FOR SOLUTION ORAL
Status: DISCONTINUED | OUTPATIENT
Start: 2024-05-24 | End: 2024-05-25 | Stop reason: HOSPADM

## 2024-05-24 RX ORDER — METOPROLOL SUCCINATE 25 MG/1
25 TABLET, EXTENDED RELEASE ORAL
Status: DISCONTINUED | OUTPATIENT
Start: 2024-05-24 | End: 2024-05-25 | Stop reason: HOSPADM

## 2024-05-24 RX ORDER — ONDANSETRON 2 MG/ML
4 INJECTION INTRAMUSCULAR; INTRAVENOUS EVERY 4 HOURS PRN
Status: DISCONTINUED | OUTPATIENT
Start: 2024-05-24 | End: 2024-05-25 | Stop reason: HOSPADM

## 2024-05-24 RX ORDER — AMOXICILLIN 250 MG
2 CAPSULE ORAL 2 TIMES DAILY
Status: DISCONTINUED | OUTPATIENT
Start: 2024-05-24 | End: 2024-05-25 | Stop reason: HOSPADM

## 2024-05-24 RX ORDER — ASPIRIN 81 MG/1
324 TABLET, CHEWABLE ORAL ONCE
Status: DISCONTINUED | OUTPATIENT
Start: 2024-05-24 | End: 2024-05-24

## 2024-05-24 RX ORDER — ASPIRIN 300 MG/1
300 SUPPOSITORY RECTAL ONCE
Status: DISCONTINUED | OUTPATIENT
Start: 2024-05-24 | End: 2024-05-24

## 2024-05-24 RX ORDER — PROMETHAZINE HYDROCHLORIDE 25 MG/1
12.5-25 SUPPOSITORY RECTAL EVERY 4 HOURS PRN
Status: DISCONTINUED | OUTPATIENT
Start: 2024-05-24 | End: 2024-05-25 | Stop reason: HOSPADM

## 2024-05-24 RX ORDER — LIDOCAINE HYDROCHLORIDE 20 MG/ML
INJECTION, SOLUTION INFILTRATION; PERINEURAL
Status: COMPLETED
Start: 2024-05-24 | End: 2024-05-24

## 2024-05-24 RX ORDER — HEPARIN SODIUM 1000 [USP'U]/ML
INJECTION, SOLUTION INTRAVENOUS; SUBCUTANEOUS
Status: COMPLETED
Start: 2024-05-24 | End: 2024-05-24

## 2024-05-24 RX ORDER — PRASUGREL 10 MG/1
60 TABLET, FILM COATED ORAL ONCE
Status: DISCONTINUED | OUTPATIENT
Start: 2024-05-24 | End: 2024-05-24

## 2024-05-24 RX ORDER — ONDANSETRON 4 MG/1
4 TABLET, ORALLY DISINTEGRATING ORAL EVERY 4 HOURS PRN
Status: DISCONTINUED | OUTPATIENT
Start: 2024-05-24 | End: 2024-05-25 | Stop reason: HOSPADM

## 2024-05-24 RX ORDER — HEPARIN SODIUM 1000 [USP'U]/ML
2000 INJECTION, SOLUTION INTRAVENOUS; SUBCUTANEOUS PRN
Status: DISCONTINUED | OUTPATIENT
Start: 2024-05-24 | End: 2024-05-24

## 2024-05-24 RX ORDER — HEPARIN SODIUM 5000 [USP'U]/100ML
0-30 INJECTION, SOLUTION INTRAVENOUS CONTINUOUS
Status: DISCONTINUED | OUTPATIENT
Start: 2024-05-24 | End: 2024-05-24

## 2024-05-24 RX ORDER — PROMETHAZINE HYDROCHLORIDE 25 MG/1
12.5-25 TABLET ORAL EVERY 4 HOURS PRN
Status: DISCONTINUED | OUTPATIENT
Start: 2024-05-24 | End: 2024-05-25 | Stop reason: HOSPADM

## 2024-05-24 RX ORDER — BIVALIRUDIN 250 MG/5ML
INJECTION, POWDER, LYOPHILIZED, FOR SOLUTION INTRAVENOUS
Status: COMPLETED
Start: 2024-05-24 | End: 2024-05-24

## 2024-05-24 RX ORDER — HEPARIN SODIUM 200 [USP'U]/100ML
INJECTION, SOLUTION INTRAVENOUS
Status: COMPLETED
Start: 2024-05-24 | End: 2024-05-24

## 2024-05-24 RX ORDER — VERAPAMIL HYDROCHLORIDE 2.5 MG/ML
INJECTION INTRAVENOUS
Status: COMPLETED
Start: 2024-05-24 | End: 2024-05-24

## 2024-05-24 RX ORDER — SODIUM CHLORIDE 9 MG/ML
3 INJECTION, SOLUTION INTRAVENOUS CONTINUOUS
Status: ACTIVE | OUTPATIENT
Start: 2024-05-24 | End: 2024-05-24

## 2024-05-24 RX ORDER — ASPIRIN 81 MG/1
81 TABLET ORAL DAILY
Status: DISCONTINUED | OUTPATIENT
Start: 2024-05-24 | End: 2024-05-25 | Stop reason: HOSPADM

## 2024-05-24 RX ORDER — ROSUVASTATIN CALCIUM 20 MG/1
20 TABLET, COATED ORAL EVERY EVENING
Status: DISCONTINUED | OUTPATIENT
Start: 2024-05-24 | End: 2024-05-25 | Stop reason: HOSPADM

## 2024-05-24 RX ADMIN — LOSARTAN POTASSIUM 25 MG: 25 TABLET, FILM COATED ORAL at 17:27

## 2024-05-24 RX ADMIN — BIVALIRUDIN 250 MG: 250 INJECTION, POWDER, LYOPHILIZED, FOR SOLUTION INTRAVENOUS at 16:02

## 2024-05-24 RX ADMIN — SODIUM CHLORIDE 3 ML/KG/HR: 9 INJECTION, SOLUTION INTRAVENOUS at 16:59

## 2024-05-24 RX ADMIN — METOPROLOL SUCCINATE 25 MG: 25 TABLET, EXTENDED RELEASE ORAL at 18:09

## 2024-05-24 RX ADMIN — MIDAZOLAM HYDROCHLORIDE 1 MG: 2 INJECTION, SOLUTION INTRAMUSCULAR; INTRAVENOUS at 16:15

## 2024-05-24 RX ADMIN — HEPARIN SODIUM: 1000 INJECTION, SOLUTION INTRAVENOUS; SUBCUTANEOUS at 15:30

## 2024-05-24 RX ADMIN — HEPARIN SODIUM 4000 UNITS: 1000 INJECTION, SOLUTION INTRAVENOUS; SUBCUTANEOUS at 10:20

## 2024-05-24 RX ADMIN — VERAPAMIL HYDROCHLORIDE 2.5 MG: 2.5 INJECTION, SOLUTION INTRAVENOUS at 15:54

## 2024-05-24 RX ADMIN — PRASUGREL 60 MG: 10 TABLET, FILM COATED ORAL at 16:20

## 2024-05-24 RX ADMIN — ROSUVASTATIN CALCIUM 20 MG: 20 TABLET, FILM COATED ORAL at 17:26

## 2024-05-24 RX ADMIN — ASPIRIN 81 MG: 81 TABLET, COATED ORAL at 17:27

## 2024-05-24 RX ADMIN — FENTANYL CITRATE 100 MCG: 50 INJECTION, SOLUTION INTRAMUSCULAR; INTRAVENOUS at 15:56

## 2024-05-24 RX ADMIN — NITROGLYCERIN: 20 INJECTION INTRAVENOUS at 15:45

## 2024-05-24 RX ADMIN — HEPARIN SODIUM 12 UNITS/KG/HR: 5000 INJECTION, SOLUTION INTRAVENOUS at 10:19

## 2024-05-24 RX ADMIN — IOHEXOL 105 ML: 350 INJECTION, SOLUTION INTRAVENOUS at 16:15

## 2024-05-24 RX ADMIN — LIDOCAINE HYDROCHLORIDE: 20 INJECTION, SOLUTION INFILTRATION; PERINEURAL at 15:53

## 2024-05-24 RX ADMIN — METOPROLOL TARTRATE 5 MG: 5 INJECTION INTRAVENOUS at 13:48

## 2024-05-24 RX ADMIN — BIVALIRUDIN 0.2 MG/KG/HR: 250 INJECTION, POWDER, LYOPHILIZED, FOR SOLUTION INTRAVENOUS at 17:02

## 2024-05-24 RX ADMIN — HEPARIN SODIUM 2000 UNITS: 200 INJECTION, SOLUTION INTRAVENOUS at 15:55

## 2024-05-24 RX ADMIN — MIDAZOLAM HYDROCHLORIDE 2 MG: 2 INJECTION, SOLUTION INTRAMUSCULAR; INTRAVENOUS at 15:55

## 2024-05-24 ASSESSMENT — LIFESTYLE VARIABLES
EVER HAD A DRINK FIRST THING IN THE MORNING TO STEADY YOUR NERVES TO GET RID OF A HANGOVER: YES
AVERAGE NUMBER OF DAYS PER WEEK YOU HAVE A DRINK CONTAINING ALCOHOL: 3
EVER FELT BAD OR GUILTY ABOUT YOUR DRINKING: NO
HOW MANY TIMES IN THE PAST YEAR HAVE YOU HAD 5 OR MORE DRINKS IN A DAY: 7
HAVE PEOPLE ANNOYED YOU BY CRITICIZING YOUR DRINKING: NO
TOTAL SCORE: 1
DOES PATIENT WANT TO STOP DRINKING: YES
HAVE YOU EVER FELT YOU SHOULD CUT DOWN ON YOUR DRINKING: NO
DOES PATIENT WANT TO TALK TO SOMEONE ABOUT QUITTING: NO
ON A TYPICAL DAY WHEN YOU DRINK ALCOHOL HOW MANY DRINKS DO YOU HAVE: 3
TOTAL SCORE: 1
TOTAL SCORE: 1
ALCOHOL_USE: YES
CONSUMPTION TOTAL: POSITIVE

## 2024-05-24 ASSESSMENT — ENCOUNTER SYMPTOMS
HEADACHES: 0
FEVER: 0
NECK PAIN: 0
MYALGIAS: 0
COUGH: 0
BLURRED VISION: 0
DIZZINESS: 0
SHORTNESS OF BREATH: 1
DOUBLE VISION: 0
SORE THROAT: 0
PALPITATIONS: 0
WEAKNESS: 0
INSOMNIA: 0
NAUSEA: 0
VOMITING: 0
BRUISES/BLEEDS EASILY: 0
DEPRESSION: 0

## 2024-05-24 ASSESSMENT — FIBROSIS 4 INDEX
FIB4 SCORE: 1.61
FIB4 SCORE: 1.61

## 2024-05-24 ASSESSMENT — SOCIAL DETERMINANTS OF HEALTH (SDOH)
WITHIN THE LAST YEAR, HAVE YOU BEEN KICKED, HIT, SLAPPED, OR OTHERWISE PHYSICALLY HURT BY YOUR PARTNER OR EX-PARTNER?: NO
WITHIN THE LAST YEAR, HAVE YOU BEEN AFRAID OF YOUR PARTNER OR EX-PARTNER?: NO
WITHIN THE LAST YEAR, HAVE YOU BEEN HUMILIATED OR EMOTIONALLY ABUSED IN OTHER WAYS BY YOUR PARTNER OR EX-PARTNER?: NO
WITHIN THE LAST YEAR, HAVE TO BEEN RAPED OR FORCED TO HAVE ANY KIND OF SEXUAL ACTIVITY BY YOUR PARTNER OR EX-PARTNER?: NO
WITHIN THE LAST YEAR, HAVE YOU BEEN KICKED, HIT, SLAPPED, OR OTHERWISE PHYSICALLY HURT BY YOUR PARTNER OR EX-PARTNER?: NO
WITHIN THE LAST YEAR, HAVE YOU BEEN HUMILIATED OR EMOTIONALLY ABUSED IN OTHER WAYS BY YOUR PARTNER OR EX-PARTNER?: NO
WITHIN THE LAST YEAR, HAVE YOU BEEN AFRAID OF YOUR PARTNER OR EX-PARTNER?: NO
WITHIN THE LAST YEAR, HAVE TO BEEN RAPED OR FORCED TO HAVE ANY KIND OF SEXUAL ACTIVITY BY YOUR PARTNER OR EX-PARTNER?: NO

## 2024-05-24 ASSESSMENT — PATIENT HEALTH QUESTIONNAIRE - PHQ9
SUM OF ALL RESPONSES TO PHQ9 QUESTIONS 1 AND 2: 0
1. LITTLE INTEREST OR PLEASURE IN DOING THINGS: NOT AT ALL
1. LITTLE INTEREST OR PLEASURE IN DOING THINGS: NOT AT ALL
2. FEELING DOWN, DEPRESSED, IRRITABLE, OR HOPELESS: NOT AT ALL
2. FEELING DOWN, DEPRESSED, IRRITABLE, OR HOPELESS: NOT AT ALL
SUM OF ALL RESPONSES TO PHQ9 QUESTIONS 1 AND 2: 0

## 2024-05-24 ASSESSMENT — COGNITIVE AND FUNCTIONAL STATUS - GENERAL
MOBILITY SCORE: 22
SUGGESTED CMS G CODE MODIFIER MOBILITY: CJ
SUGGESTED CMS G CODE MODIFIER DAILY ACTIVITY: CH
DAILY ACTIVITIY SCORE: 24
CLIMB 3 TO 5 STEPS WITH RAILING: A LITTLE
WALKING IN HOSPITAL ROOM: A LITTLE

## 2024-05-24 ASSESSMENT — PAIN DESCRIPTION - DESCRIPTORS: DESCRIPTORS: ACHING

## 2024-05-24 NOTE — PROGRESS NOTES
Patient brought to T7 by this RN. Patient A&Ox4, no complaints of chest pain or SOB. Patient has heparin running at 12units/kg/hr. Patient NPO awaiting cardiology consult. Patient updated on POC. All fall precautions in place. Personal belongings and call light within reach. Patient oriented to room and unit. Tele monitor on, monitor room notified. Bed is locked in lowest position. Care ongoing.

## 2024-05-24 NOTE — ED TRIAGE NOTES
Pt BIB care flight .  Chief Complaint   Patient presents with    Chest Pain     CP radiating to right shoulder and jaw with lightheadedness starting at 0330 this AM. Pt given 2 nitro at home and ASA and nitro given en route by care flight.     Pt roomed in green 25, dressed in gown, hooked up to monitor. Labs sent, EKG completed.

## 2024-05-24 NOTE — H&P
"Hospital Medicine History & Physical Note    Date of Service  5/24/2024    Primary Care Physician  Pcp Pt States None    Consultants  cardiology    Specialist Names: ERP discussed with Cardiologist    Code Status  Full Code    Chief Complaint  Chief Complaint   Patient presents with    Chest Pain     CP radiating to right shoulder and jaw with lightheadedness starting at 0330 this AM. Pt given 2 nitro at home and ASA and nitro given en route by care flight.        History of Presenting Illness  Joe Corrales is a 58 y.o. male h/o CAD s/p PCI left circumflex for STEMI in 2015 and PCI to posterior branch of RCA in 2022, who is not compliant with his Plavix and  presented to the emergency department on 5/24/2024 with chest pain.  Symptoms started last night, waking him up around 1 AM.  Patient reports he had a pain described as \"squeezing like sharp \"pain mostly in the middle of the chest with radiation to his jaw.  Patient took 325 mg of aspirin and 2 nitroglycerin with full resolution of his pain but decided to come to the ER for further evaluation.    I discussed the plan of care with patient and ERP Dr. Elmore .    Review of Systems  Review of Systems   Constitutional:  Negative for fever.   HENT:  Negative for congestion and sore throat.    Eyes:  Negative for blurred vision and double vision.   Respiratory:  Positive for shortness of breath. Negative for cough.    Cardiovascular:  Positive for chest pain. Negative for palpitations.   Gastrointestinal:  Negative for nausea and vomiting.   Genitourinary:  Negative for dysuria and urgency.   Musculoskeletal:  Negative for myalgias and neck pain.   Skin:  Negative for itching and rash.   Neurological:  Negative for dizziness, weakness and headaches.   Endo/Heme/Allergies:  Does not bruise/bleed easily.   Psychiatric/Behavioral:  Negative for depression. The patient does not have insomnia.        Past Medical History  CAD    Surgical History  Coronary stents as " described above.    Family History  Reviewed and not pertinent  Family history reviewed with patient. There is no family history that is pertinent to the chief complaint.     Social History   reports that he has been smoking cigarettes. He has never used smokeless tobacco. He reports current alcohol use of about 1.2 oz of alcohol per week. He reports current drug use. Drug: Inhaled.    Allergies  No Known Allergies    Medications  Prior to Admission Medications   Prescriptions Last Dose Informant Patient Reported? Taking?   aspirin 81 MG EC tablet   No No   Sig: Take 1 Tablet by mouth every day.   losartan (COZAAR) 25 MG Tab   No No   Sig: Take 1 Tablet by mouth every evening.   metoprolol SR (TOPROL XL) 25 MG TABLET SR 24 HR   No No   Sig: Take 1 Tablet by mouth every day.   nitroglycerin (NITROSTAT) 0.4 MG SL Tab 5/24/2024 at 0330  No Yes   Sig: Place 1 Tablet under the tongue as needed for Chest Pain (up to 3 doses (if SBP greater than 90 mmHg)).   prasugrel (EFFIENT) 10 MG Tab   No No   Sig: Take 1 Tablet by mouth every day.   rosuvastatin (CRESTOR) 20 MG Tab   No No   Sig: Take 1 Tablet by mouth every evening.      Facility-Administered Medications: None       Physical Exam  Temp:  [36.7 °C (98 °F)] 36.7 °C (98 °F)  Pulse:  [65] 65  Resp:  [14-20] 20  BP: (177)/(97) 177/97  SpO2:  [96 %] 96 %  Blood Pressure: (!) 177/97   Temperature: 36.7 °C (98 °F)   Pulse: 65   Respiration: 20   Pulse Oximetry: 96 %       Physical Exam  Constitutional:       Appearance: Normal appearance.   HENT:      Head: Normocephalic and atraumatic.      Nose: Nose normal.      Mouth/Throat:      Mouth: Mucous membranes are moist.      Pharynx: Oropharynx is clear.   Eyes:      Extraocular Movements: Extraocular movements intact.      Pupils: Pupils are equal, round, and reactive to light.   Cardiovascular:      Rate and Rhythm: Normal rate and regular rhythm.      Pulses: Normal pulses.      Heart sounds: Normal heart sounds.    Pulmonary:      Effort: Pulmonary effort is normal.      Breath sounds: Normal breath sounds.   Abdominal:      General: Abdomen is flat. Bowel sounds are normal.      Palpations: Abdomen is soft.   Musculoskeletal:      Cervical back: Normal range of motion and neck supple.   Skin:     General: Skin is warm and dry.   Neurological:      General: No focal deficit present.      Mental Status: He is alert and oriented to person, place, and time.   Psychiatric:         Mood and Affect: Mood normal.         Behavior: Behavior normal.         Laboratory:  Recent Labs     05/24/24  0744   WBC 8.3   RBC 5.72   HEMOGLOBIN 16.4   HEMATOCRIT 48.2   MCV 84.3   MCH 28.7   MCHC 34.0   RDW 41.6   PLATELETCT 209   MPV 10.6     Recent Labs     05/24/24  0744   SODIUM 138   POTASSIUM 4.4   CHLORIDE 105   CO2 23   GLUCOSE 101*   BUN 16   CREATININE 0.78   CALCIUM 8.9     Recent Labs     05/24/24  0744   ALTSGPT 32   ASTSGOT 34   ALKPHOSPHAT 98   TBILIRUBIN 0.7   LIPASE 26   GLUCOSE 101*               Recent Labs     05/24/24  0744   TROPONINT 110*       Imaging:  DX-CHEST-PORTABLE (1 VIEW)   Final Result      No acute cardiac or pulmonary abnormalities are identified.          X-Ray:  I have personally reviewed the images and compared with prior images. and My impression is: Chest x-ray was negative for acute abnormalities  EKG:  I have personally reviewed the images and compared with prior images. and My impression is: Normal sinus rhythm at 66 bpm.  Normal axis and no acute ST elevation.    Assessment/Plan:  Justification for Admission Status  I anticipate this patient will require at least two midnights for appropriate medical management, necessitating inpatient admission because 58-year-old male, coming with non-STEMI    * NSTEMI (non-ST elevated myocardial infarction) (HCC)- (present on admission)  Assessment & Plan  -Inpatient status to telemetry floor.  -Patient with significant cardiac history, 2 previous stents and  noncompliant with Plavix.  -Trending up troponin 110 > 315  -Pain improved with nitroglycerin.  -Will keep patient n.p.o., he will likely need cardiac Cath  -Patient will need heparin infusion therapy which was started.  This will need to closely monitoring, if troponin is trending up, having acute chest pain or EKG changes, he will need urgent cardiac catheterization.  -ERP discussed the case with cardiologist.  I appreciate consult and recommendations.  -Serial cardiac enzymes added    Tobacco abuse- (present on admission)  Assessment & Plan  -Patient states he is trying to cut back on smoking.  Encouraged him to continue tobacco smoking cessation efforts.    Dyslipidemia- (present on admission)  Assessment & Plan  -Continue rosuvastatin.    Noncompliance- (present on admission)  Assessment & Plan  -Patient is not compliant with beta-blockers and Plavix as he did mention it gives him too much side effects.  -I discussed at length, need to continue to take his medications to avoid further coronary complications.    Alcohol abuse- (present on admission)  Assessment & Plan  -Monitor for alcohol withdrawal.      The patient remains critically ill.  Critical care time =57  minutes in directly providing and coordinating critical care and extensive data review.  This includes time spent before the visit reviewing the chart, time spent evaluating the patient face-to-face  in the room, time discussing and updating [facility] Staff about plan and time spent after the visit reviwing results and on documentation. No time overlap and excludes procedures.      VTE prophylaxis: SCDs/TEDs and therapeutic anticoagulation with heparin infusion therapy for non-STEMI

## 2024-05-24 NOTE — PROCEDURES
Cardiac Catheterization report    5/24/2024  4:15 PM    REFERRING MD: Dr. Payan    INDICATION/PREOPERATIVE DIAGNOSIS:  Non-STEMI  Hypertensive emergency    POSTOPERATIVE DIAGNOSIS:  90% hazy culprit mid LCx/OM  Patent previously placed OM stent  Patent previously placed RPL B stent  LVEDP 15 mmHg, LVEF 55 to 60%  Successful IVUS guided PCI culprit LCx/OM (3.0 x 26 mm [place] NIURKA), excellent result    RECOMMENDATIONS:  Guideline directed medical therapy   Cardiovascular Risk factor modification  Dual antiplatelet therapy for minimum 1 year  Smoking cessation      PROCEDURES PERFORMED:  Coronary arteriograms  Left heart catheterization and Left ventriculogram   Supervision moderate sedation  Percutaneous coronary intervention  Intravascular ultrasound      FINDINGS:  I.  HEMODYNAMICS   Ao: 107/76 mmHg   LEDP: 15 mmHg   Gradient on LV pullback: No    II. CORONARY ANGIOGRAPHY:  Left main coronary artery: Large bifurcating no CAD  Left anterior descending artery: Large-caliber wraparound apex moderate nonobstructive CAD  Left circumflex coronary artery: Large-caliber in its proximal portion continues as a small vessel in the AV groove after giving off a large OM1.  The large OM1 is notable for a previously placed stent that is widely patent in its midportion.  Proximal to the stent is a hazy 90% culprit stenosis.  Post PCI 0% residual stenosis ENDY-3 flow.  Right coronary artery: Moderate caliber vessel supplying RPDA and RPL B.  Is with a patent previously placed RPL B stent.  There is mild nonobstructive CAD otherwise.    III.  LEFT VENTRICULOGRAM:  LVEF GILES PROJECTION: 55-60%      Procedure details:  The risks and benefits of cardiac catheterization and possible intervention were explained to the patient including death, heart attack, stroke, and emergency surgery.  The patient was brought to the cardiac catheterization lab where the right wrist was prepped and draped in the usual manner for cardiac catheterization.   "The area was anesthetized with lidocaine and a 5/6 FR sheath was inserted into the right radial artery without difficulty. A Chalino catheter was advanced to the ostia of the Left coronary artery and arteriograms were recorded.   A Chalino catheter was advanced to the ostia of the right coronary artery and arteriograms were recorded. Aortic valve was crossed using Chalino catheter for left heart catheterization was performed.     Description of PCI:  The decision was made to intervene on the culprit coronary artery.  Anticoagulation was initiated as below.  The diagnostic catheter was exchanged over a wire for an 6 [place] EBU 3.5 guide seated appropriately. A 0.014\" mm  Runthrough was advanced into the artery and use to cross the lesion. A 3.0 mm balloon was used to predilate the lesion.  IVUS was then advanced distal to the lesion amendable back recorded.  A 3.0 x 26 mm [place] NIURKA was then positioned and deployed at nominal pressure. Following this a 3.0 mm noncompliant balloon to 20 tyrone was used to post dilate the stent.  IVUS was repeated showing excellent stent expansion and apposition.  There was an excellent angiographic result with ENDY-3 flow and no residual stenosis in the stented segment. All catheters and guidewires were removed.    At the completion of the case the sheath was removed and hemostasis achieved utilizing a radial compression band patient left Cath Lab in stable condition and there were no apparent complications.    Anticoagulant: Angiomax  Antiplatelet: Effient (prasugrel)  EBL <25 cc  Complications: none  Specimens: none  Contrast: 105 cc    Complications:  None apparent    Sedation time:  Moderate sedation directly monitored by me during the case while supervising the administration of the sedation medication by an independent trained RN to assist in the monitoring of the patient's level of consciousness and physiological status. I, the supervising physician was present the entire time from " beginning of medication administration until the end of the procedure from 1548 until 1612. For detailed administration records please see the moderate sedation documentation in the media tab.    Following the procedure I discussed the results with the patient who specifically indicated no one else should be called.    Domo Recinos MD, FACC, [facility]

## 2024-05-24 NOTE — ED PROVIDER NOTES
ED Provider Note    CHIEF COMPLAINT  Chief Complaint   Patient presents with    Chest Pain     CP radiating to right shoulder and jaw with lightheadedness starting at 0330 this AM. Pt given 2 nitro at home and ASA and nitro given en route by care flight.        EXTERNAL RECORDS REVIEWED  Inpatient Notes hospitalization 4/12/2022, patient with history of CAD and stent placement in the circumflex 2015, had NSTEMI on 4/12/2022, placement of stent of the lateral branch of the RCA at that time    HPI/ROS  LIMITATION TO HISTORY   Select: : None      Joe Corrales is a 58 y.o. male who presents with chief complaint of chest pain radiating into his jaw that started very early this morning, at around 330, 4 hours prior to arrival.  Patient with an extensive history of CAD, stent placed to left circumflex in 2015, and stent placed to lateral branch of RCA placed 4/12/2022.  Patient states that the pain is squeezing-like and sore like in quality.  Patient reports that typically his chest pain from his heart attack is more tightness.  Patient reports he did drink 1 alcoholic beverage and had a normal dinner last night.  Patient denies associated abdominal pain.  Patient states that the pain was in the center of his chest with some radiation to his jaw, the triage note mentions shoulder pain as well over patient reports he denies any associated shoulder pain or back pain.  Denies any tearing quality pain.  Patient reports that pain persisted until he called level 1, at that time he was given aspirin, 325 and nitroglycerin.  Following the nitroglycerin patient's pain resolved.  No significant dysphagia.  Patient reports he stopped taking his Plavix around a year ago as he did not like the way it made him feel, he has a difficult time elaborating exactly what that means.    PAST MEDICAL HISTORY       SURGICAL HISTORY  patient denies any surgical history    FAMILY HISTORY  No family history on file.    SOCIAL HISTORY  Social  "History     Tobacco Use    Smoking status: Every Day     Current packs/day: 0.50     Types: Cigarettes    Smokeless tobacco: Never   Vaping Use    Vaping status: Never Used   Substance and Sexual Activity    Alcohol use: Yes     Alcohol/week: 1.2 oz     Types: 2 Cans of beer per week     Comment: occ    Drug use: Yes     Types: Inhaled     Comment: marijuana    Sexual activity: Not on file       CURRENT MEDICATIONS  Home Medications    **Home medications have not yet been reviewed for this encounter**         ALLERGIES  No Known Allergies    PHYSICAL EXAM  VITAL SIGNS: BP (!) 177/97   Pulse 65   Temp 36.7 °C (98 °F) (Temporal)   Resp 14   Ht 1.702 m (5' 7\")   Wt 79.4 kg (175 lb)   SpO2 96%   BMI 27.41 kg/m²    Physical Exam  Constitutional:       Appearance: Normal appearance.   HENT:      Head: Normocephalic.      Right Ear: Tympanic membrane normal.      Left Ear: Tympanic membrane normal.      Nose: Nose normal.      Mouth/Throat:      Mouth: Mucous membranes are moist.   Eyes:      Extraocular Movements: Extraocular movements intact.      Pupils: Pupils are equal, round, and reactive to light.   Cardiovascular:      Rate and Rhythm: Normal rate and regular rhythm.   Pulmonary:      Effort: Pulmonary effort is normal. No respiratory distress.      Breath sounds: Normal breath sounds. No stridor. No wheezing or rales.   Chest:      Chest wall: No tenderness.   Abdominal:      General: Abdomen is flat. There is no distension.      Palpations: Abdomen is soft. There is no mass.      Tenderness: There is no abdominal tenderness.   Musculoskeletal:      Cervical back: Normal range of motion.   Skin:     General: Skin is warm.      Capillary Refill: Capillary refill takes less than 2 seconds.   Neurological:      General: No focal deficit present.      Mental Status: He is alert and oriented to person, place, and time.   Psychiatric:         Mood and Affect: Mood normal.           EKG/LABS  Results for orders " placed or performed during the hospital encounter of 05/24/24   CBC WITH DIFFERENTIAL   Result Value Ref Range    WBC 8.3 4.8 - 10.8 K/uL    RBC 5.72 4.70 - 6.10 M/uL    Hemoglobin 16.4 14.0 - 18.0 g/dL    Hematocrit 48.2 42.0 - 52.0 %    MCV 84.3 81.4 - 97.8 fL    MCH 28.7 27.0 - 33.0 pg    MCHC 34.0 32.3 - 36.5 g/dL    RDW 41.6 35.9 - 50.0 fL    Platelet Count 209 164 - 446 K/uL    MPV 10.6 9.0 - 12.9 fL    Neutrophils-Polys 74.00 (H) 44.00 - 72.00 %    Lymphocytes 18.40 (L) 22.00 - 41.00 %    Monocytes 5.50 0.00 - 13.40 %    Eosinophils 1.20 0.00 - 6.90 %    Basophils 0.20 0.00 - 1.80 %    Immature Granulocytes 0.70 0.00 - 0.90 %    Nucleated RBC 0.00 0.00 - 0.20 /100 WBC    Neutrophils (Absolute) 6.15 1.82 - 7.42 K/uL    Lymphs (Absolute) 1.53 1.00 - 4.80 K/uL    Monos (Absolute) 0.46 0.00 - 0.85 K/uL    Eos (Absolute) 0.10 0.00 - 0.51 K/uL    Baso (Absolute) 0.02 0.00 - 0.12 K/uL    Immature Granulocytes (abs) 0.06 0.00 - 0.11 K/uL    NRBC (Absolute) 0.00 K/uL   CMP   Result Value Ref Range    Sodium 138 135 - 145 mmol/L    Potassium 4.4 3.6 - 5.5 mmol/L    Chloride 105 96 - 112 mmol/L    Co2 23 20 - 33 mmol/L    Anion Gap 10.0 7.0 - 16.0    Glucose 101 (H) 65 - 99 mg/dL    Bun 16 8 - 22 mg/dL    Creatinine 0.78 0.50 - 1.40 mg/dL    Calcium 8.9 8.5 - 10.5 mg/dL    Correct Calcium 8.9 8.5 - 10.5 mg/dL    AST(SGOT) 34 12 - 45 U/L    ALT(SGPT) 32 2 - 50 U/L    Alkaline Phosphatase 98 30 - 99 U/L    Total Bilirubin 0.7 0.1 - 1.5 mg/dL    Albumin 4.0 3.2 - 4.9 g/dL    Total Protein 6.9 6.0 - 8.2 g/dL    Globulin 2.9 1.9 - 3.5 g/dL    A-G Ratio 1.4 g/dL   LIPASE   Result Value Ref Range    Lipase 26 11 - 82 U/L   TROPONIN   Result Value Ref Range    Troponin T 110 (H) 6 - 19 ng/L   ESTIMATED GFR   Result Value Ref Range    GFR (CKD-EPI) 104 >60 mL/min/1.73 m 2   URINE DRUG SCREEN   Result Value Ref Range    Amphetamines Urine Negative Negative    Barbiturates Negative Negative    Benzodiazepines Negative Negative     Cocaine Metabolite Negative Negative    Fentanyl, Urine Negative Negative    Methadone Negative Negative    Opiates Negative Negative    Oxycodone Negative Negative    Phencyclidine -Pcp Negative Negative    Propoxyphene Negative Negative    Cannabinoid Metab Negative Negative   TROPONIN   Result Value Ref Range    Troponin T 315 (H) 6 - 19 ng/L   aPTT   Result Value Ref Range    APTT 28.1 24.7 - 36.0 sec   Prothrombin Time   Result Value Ref Range    PT 12.9 12.0 - 14.6 sec    INR 0.96 0.87 - 1.13   Heparin Xa (Unfractionated)   Result Value Ref Range    Heparin Xa (UFH) <0.10 IU/mL   EKG   Result Value Ref Range    Report       [facility] Emergency Dept.    Test Date:  2024  Pt Name:    ART GARCIA                  Department: ER  MRN:        [id number]                      Room:       [facility]  Gender:     Male                         Technician: [id number]  :        1965                   Requested By:ER TRIAGE PROTOCOL  Order #:    [id number]                    Reading MD: Ramírez Gamble MD    Measurements  Intervals                                Axis  Rate:       66                           P:          46  PA:         131                          QRS:        4  QRSD:       98                           T:          76  QT:         370  QTc:        388    Interpretive Statements  EKG is normal sinus rhythm, normal axis, normal intervals, no significant ST  elevation consistent with acute regional ischemia, largely unchanged from EKG  2022  Electronically Signed On 2024 07:44:49 PDT by Ramírez Gamble MD         I have independently interpreted this EKG    RADIOLOGY/PROCEDURES   I have independently interpreted the diagnostic imaging associated with this visit and am waiting the final reading from the radiologist.   My preliminary interpretation is as follows: Unremarkable chest x-ray    Radiologist interpretation:  DX-CHEST-PORTABLE (1 VIEW)   Final Result      No acute cardiac  or pulmonary abnormalities are identified.        CRITICAL CARE  The very real possibilty of a deterioration of this patient's condition required the highest level of my preparedness for sudden, emergent intervention.  I provided critical care services, which included medication orders, frequent reevaluations of the patient's condition and response to treatment, ordering and reviewing test results, and discussing the case with various consultants.  The critical care time associated with the care of the patient was 35 minutes. Review chart for interventions. This time is exclusive of any other billable procedures.       COURSE & MEDICAL DECISION MAKING    ASSESSMENT, COURSE AND PLAN  Care Narrative: Patient here with chest pain.  Highly concerning personal history of CAD.  Patient noncompliant with his Plavix.  Stent occlusion versus new occlusive coronary artery disease is very high on my differential.  Patient symptoms resolved after nitroglycerin also suggesting possible cardiac cause.  Patient without any associated dysphagia to suggest esophageal pathology  Patient without any lower extremity edema.  Patient with equal pulses in all 4 extremities.  Patient's symptoms have resolved, a fixed lesion such as pulmonary embolus or dissection be highly unlikely given the resolution of patient's symptoms.  Patient's troponin has returned elevated to greater than 100, will discuss case with cardiology, patient will be admitted for further restratification.  He has already received aspirin  Case discussed with hospitalist  I discussed the case with cardiology they will see and follow patient.  I started patient on a heparin drip for NSTEMI.  Patient has no associated back pain, no tearing quality pain, symptoms have resolved.    Patient's troponin increasing.            DISPOSITION AND DISCUSSIONS  I have discussed management of the patient with the following physicians and HERMILO's: Cardiology, Dr. Payan, hospitalist   [place]        Barriers to care at this time, including but not limited to: Patient does not have established PCP.         FINAL DIAGNOSIS  1. Elevated troponin    2. Acute chest pain    NSTEMI

## 2024-05-24 NOTE — CONSULTS
Reason of Consult: Consideration of interventional management    Consulting Physician: Dr. Payan    HPI:  58-year-old male cardiology patient of Dr. Rajan who most recently consulted on him in the hospital in 2022 admitted to the emergency department after developing right shoulder and jaw discomfort associated with chest pain which was substernal and some lightheadedness around 330 this morning.  Transferred in by care flight and has had resolution of chest pain.  Troponins have been elevated and are rising.  No recurrent chest discomfort.  History of coronary disease status post PCI for STEMI 2015 and then PCI of the occluded RPL B with a suboptimal result in 2022 for non-STEMI.  Ongoing smoking despite recurrent coronary artery disease, denies drug use or excessive alcohol use.  Denies family history precocious CAD.  Upon arrival blood pressure is in the hypertensive emergency range with a MAP of 124, ECG reveals inferior Q waves unchanged from 2022 and no acute ischemic changes.  Chest pain remains resolved.    PMH:  CAD status post PCI x 2  Tobacco abuse  Alcohol use  Hypertension    Social History     Socioeconomic History    Marital status: Single     Spouse name: Not on file    Number of children: Not on file    Years of education: Not on file    Highest education level: Not on file   Occupational History    Not on file   Tobacco Use    Smoking status: Every Day     Current packs/day: 0.50     Types: Cigarettes    Smokeless tobacco: Never   Vaping Use    Vaping status: Never Used   Substance and Sexual Activity    Alcohol use: Yes     Alcohol/week: 1.2 oz     Types: 2 Cans of beer per week     Comment: occ    Drug use: Yes     Types: Inhaled     Comment: marijuana    Sexual activity: Not on file   Other Topics Concern    Not on file   Social History Narrative    Not on file     Social Determinants of Health     Financial Resource Strain: Not on file   Food Insecurity: Not on file   Transportation Needs:  Not on file   Physical Activity: Not on file   Stress: Not on file   Social Connections: Not on file   Intimate Partner Violence: Not At Risk (5/24/2024)    Humiliation, Afraid, Rape, and Kick questionnaire     Fear of Current or Ex-Partner: No     Emotionally Abused: No     Physically Abused: No     Sexually Abused: No   Housing Stability: Not on file       No current facility-administered medications on file prior to encounter.     Current Outpatient Medications on File Prior to Encounter   Medication Sig Dispense Refill    aspirin 81 MG EC tablet Take 1 Tablet by mouth every day. 30 Tablet 11    losartan (COZAAR) 25 MG Tab Take 1 Tablet by mouth every evening. 30 Tablet 11    nitroglycerin (NITROSTAT) 0.4 MG SL Tab Place 1 Tablet under the tongue as needed for Chest Pain (up to 3 doses (if SBP greater than 90 mmHg)). 25 Tablet 2       Current Facility-Administered Medications   Medication Dose Frequency Provider Last Rate Last Admin    [START ON 5/25/2024] aspirin EC tablet 81 mg  81 mg DAILY Miguelina Saleh M.D.        losartan (Cozaar) tablet 25 mg  25 mg Q EVENING Miguelina Saleh M.D.        rosuvastatin (Crestor) tablet 20 mg  20 mg Q EVENING Miguelina Saleh M.D.        acetaminophen (Tylenol) tablet 650 mg  650 mg Q6HRS PRN Miguelina Saleh M.D.        senna-docusate (Pericolace Or Senokot S) 8.6-50 MG per tablet 2 Tablet  2 Tablet BID Miguelina Saleh M.D.        And    polyethylene glycol/lytes (Miralax) Packet 1 Packet  1 Packet QDAY PRN Miguelina Saleh M.D.        ondansetron (Zofran) syringe/vial injection 4 mg  4 mg Q4HRS PRN Miguelina Saleh M.D.        ondansetron (Zofran ODT) dispertab 4 mg  4 mg Q4HRS PRN Miguelina Saleh M.D.        promethazine (Phenergan) tablet 12.5-25 mg  12.5-25 mg Q4HRS PRN Miguelina Saleh M.D.        promethazine (Phenergan) suppository 12.5-25 mg  12.5-25 mg Q4HRS PRN Miguelina Saleh M.D.         "prochlorperazine (Compazine) injection 5-10 mg  5-10 mg Q4HRS PRN Miguelina Saleh M.D.        aspirin (Asa) tablet 325 mg  325 mg Once Miguelina Saleh M.D.        Or    aspirin (Asa) chewable tab 324 mg  324 mg Once Miguelina Saleh M.D.        Or    aspirin (Asa) suppository 300 mg  300 mg Once Miguelina Saleh M.D.        heparin infusion 25,000 units in 500 mL 0.45% NACL  0-30 Units/kg/hr Continuous Tye Elmore M.D. 19.1 mL/hr at 05/24/24 1019 12 Units/kg/hr at 05/24/24 1019    heparin injection 2,000 Units  2,000 Units PRN Tye Elmore M.D.        metoprolol SR (Toprol XL) tablet 25 mg  25 mg Q DAY Jeevan Moss M.D.       Last reviewed on 5/24/2024 12:43 PM by Malia Lund R.N.     Patient has no known allergies.    History reviewed. No pertinent family history.    ROS: As per HPI all other systems reviewed and negative     Physical Exam   Blood pressure (!) 175/103, pulse 64, temperature 36.4 °C (97.5 °F), temperature source Temporal, resp. rate 18, height 1.702 m (5' 7\"), weight 80.1 kg (176 lb 9.4 oz), SpO2 97%.    Constitutional:  Appears well-developed.   HENT: Normocephalic and atraumatic. No scleral icterus.   Neck: No JVD present.   Cardiovascular: Normal rate.   Pulmonary/Chest: Normal chest rise  Abdominal: S/NT/ND BS+   Musculoskeletal:  Pulses present. No atrophy. Strength normal.  Extremities: Exhibits no edema. No clubbing or cyanosis.   Skin: Skin is warm and dry.   Neuro: Non-focal, CN 2-12 intact grossly    No intake or output data in the 24 hours ending 05/24/24 1521    Recent Labs     05/24/24  0744   WBC 8.3   RBC 5.72   HEMOGLOBIN 16.4   HEMATOCRIT 48.2   MCV 84.3   MCH 28.7   MCHC 34.0   RDW 41.6   PLATELETCT 209   MPV 10.6     Recent Labs     05/24/24  0744   SODIUM 138   POTASSIUM 4.4   CHLORIDE 105   CO2 23   GLUCOSE 101*   BUN 16   CREATININE 0.78   CALCIUM 8.9     Recent Labs     05/24/24  0946   APTT 28.1   INR 0.96                   Imaging " reviewed    Impressions:  1.  Non-STEMI  2.  Hypertensive emergency  3.  Coronary disease status post PCI 2015 and 2022 for ACS  4.  Questionable medical compliance  5.  Ongoing tobacco abuse  6.  Hyperlipidemia    Recommendations:  This may represent a type II demand MI from hypertensive emergency however type I MI can also precipitate hypertensive urgency as a secondary feature.  Given his previous recurrent ACS I do recommend/agree with invasive coronary angiography and possible PCI in conjunction with the excellent medical therapy which is being administered by the primary cardiovascular service.    I have discussed the risks and benefits of cardiac catheterization as well as the procedure itself, rationale and appropriateness in detail with the patient today. Complications including but not limited to death, stroke, MI, urgent bypass surgery, contrast nephropathy, vascular complications, bleeding and infection were explained to the patient. The potential outcomes associated with the procedure (possible PCI, possible CABG, possible medical Rx only) were also discussed at length. The patient agrees to proceed.'  Given the above I agree with interventional evaluation/management with coronary angiography and possible PCI.  Recommend continuing guideline directed medical therapy and care of ancillary medical conditions under the care of his primary cardiology service.    Thank you for this interesting consultation. It was my pleasure to see Art S Atnoni today.    Domo Recinos MD, FACC, [facility]  Division of Interventional Cardiology  [facility]     requested as documented explicitly above to assess the patient's candidacy for PCI and to provide complete informed consent for a possible intervention, which is a procedure that is not performed by General Cardiology and for which General Cardiology cannot provide complete informed consent.    I specifically discussed with the patient the risk of a life-altering or life-threatening complication such as major bleeding requiring additional intervention, disabling stroke, major heart attack, need for emergency heart surgery, and/or death with a routine PCI procedure. If there was a need for advanced techniques such as rotational atherectomy, laser atherectomy, or intracoronary lithotripsy, the risk of major complications increases.  Additionally, we discussed that, due to the patient's presentation and comorbidities they may be at increased risk for major complications if PCI is performed.  We discussed that the risk of kidney injury requiring temporary or permanent dialysis is very low in patients with normal renal function, but ranges from 1-5% in patients with abnormal renal function.  The risk of minor complications such as significant pain, infection, minor heart attack, or minor bleeding not requiring additional intervention can be as high as 5-10%.    We also discussed that a coronary artery stent is a metal scaffold that is permanently implanted in a blood vessel of the heart and would require several months of dual antiplatelet therapy in addition to lifelong therapy with at least one antiplatelet agent and that noncompliance with antiplatelet therapy could result in a blood clot forming within the stent; potentially resulting in a major or life-threatening heart attack.  We further discussed that, with contemporary stents, the risk of stent failure is approximately 1-2%/year.  The patient confirmed their understanding of the importance of adherence with antiplatelet therapy and denied any recent serious  bleeding episodes that would be a clear contraindication to initiating antiplatelet therapy.  The patient also denied any upcoming urgent surgeries that could not be postponed if they were started on dual antiplatelet therapy.    I personally assessed the patient and feel that they are an appropriate candidate for cardiac catheterization with PCI.  Further recommendations will be pending findings at time of cardiac catheterization.

## 2024-05-24 NOTE — PROGRESS NOTES
4 Eyes Skin Assessment Completed by LEXIS Finley and LEXIS Leyva.    Head WDL  Ears WDL  Nose WDL  Mouth WDL  Neck WDL  Breast/Chest WDL  Shoulder Blades WDL  Spine WDL  (R) Arm/Elbow/Hand WDL  (L) Arm/Elbow/Hand WDL  Abdomen WDL  Groin WDL  Scrotum/Coccyx/Buttocks WDL  (R) Leg WDL  (L) Leg WDL  (R) Heel/Foot/Toe WDL  (L) Heel/Foot/Toe WDL          Devices In Places Tele Box and Blood Pressure Cuff      Interventions In Place Pillows    Possible Skin Injury No    Pictures Uploaded Into Epic N/A  Wound Consult Placed N/A  RN Wound Prevention Protocol Ordered No

## 2024-05-24 NOTE — CONSULTS
Cardiology Consult Note:    Jeevan Moss M.D.  Date & Time note created:    5/24/2024   3:24 PM     Referring MD:  Miguelina Saleh M.D.      Patient ID:  Name:             Joe Corrales   YOB: 1965  Age:                 58 y.o.  male   MRN:               [id number]                                                             Reason for Consult:      NSTEMI    History of Present Illness:    Patient has the chest pain during sleep and was woke up by the pain early this morning, the pain is diffuse in the chest and ribs radiate to the bilateral arms and jaws, squeezing feeling, constant, nothing can make it better or worse; he gave himself improving and nitroglycerin which did not work, so he called 911, on the way to the [facility], he got another dose of nitro and a loading dose of aspirin, after that the pain is getting relieved.  Since then he has no new chest pain, however troponins keep trending up, no dramatic EKG change.  Patient has history of stent implantation, 1 in 2015 in the [facility], 1 in 2022 in the [facility].  Last coronary anatomy in 2022 :              Left Main: Normal              LAD: Minimal luminal irregularities               Ramus: Proximal 30% stenosis.              LCx: Minimal luminal irregularities in the AV groove.  The first OM has a 50% proximal stenosis followed by a widely patent stent in the midportion.              RCA: Dominant, 30% stenosis proximally.  The PDA branch is high and is normal.  The posterior lateral branch is 100% occluded.Stent placed.     LVEF 55% from the echo in 2022. Last lipid profile in 2022 showing LDL 90. Not compliant to medication, he is taking aspirin but no statin due to concern of side effects.  He is still smoking and drinking alcohol occasionally.        Review of Systems:      Constitutional: Denies fevers, Denies weight changes  Eyes: Denies changes in vision, no eye pain  Ears/Nose/Throat/Mouth: Denies nasal congestion or sore throat    Cardiovascular: Positive for  chest pain, No palpitations   Respiratory:  No shortness of breath , Denies cough  Gastrointestinal/Hepatic: Denies abdominal pain, nausea, vomiting, diarrhea, constipation or GI bleeding   Genitourinary: Denies dysuria or frequency  Musculoskeletal/Rheum: Denies  joint pain and swelling, No edema  Skin: Denies rash  Neurological: Denies headache, confusion, memory loss or focal weakness/parasthesias  Psychiatric: denies mood disorder   Endocrine: Leonor thyroid problems  Heme/Oncology/Lymph Nodes: Denies enlarged lymph nodes, denies brusing or known bleeding disorder  All other systems were reviewed and are negative (AMA/CMS criteria)                Past Medical History:   History reviewed. No pertinent past medical history.  Active Hospital Problems    Diagnosis     Atypical chest pain [R07.89]     NSTEMI (non-ST elevated myocardial infarction) (HCC) [I21.4]        Past Surgical History:  History reviewed. No pertinent surgical history.    Hospital Medications:    Current Facility-Administered Medications:     [START ON 5/25/2024] aspirin EC tablet 81 mg, 81 mg, Oral, DAILY, Miguelina Saleh M.D.    losartan (Cozaar) tablet 25 mg, 25 mg, Oral, Q EVENING, Miguelina Saleh M.D.    rosuvastatin (Crestor) tablet 20 mg, 20 mg, Oral, Q EVENING, Miguelina Saleh M.D.    acetaminophen (Tylenol) tablet 650 mg, 650 mg, Oral, Q6HRS PRN, Miguelina Saleh M.D.    senna-docusate (Pericolace Or Senokot S) 8.6-50 MG per tablet 2 Tablet, 2 Tablet, Oral, BID **AND** polyethylene glycol/lytes (Miralax) Packet 1 Packet, 1 Packet, Oral, QDAY PRN, Miguelina Saleh M.D.    ondansetron (Zofran) syringe/vial injection 4 mg, 4 mg, Intravenous, Q4HRS PRN, Miguelina Saleh M.D.    ondansetron (Zofran ODT) dispertab 4 mg, 4 mg, Oral, Q4HRS PRN, Miguelina Ophelia-Lista, M.D.    promethazine (Phenergan) tablet 12.5-25 mg, 12.5-25 mg, Oral, Q4HRS PRN, Miguelina  SANDIE Saleh    promethazine (Phenergan) suppository 12.5-25 mg, 12.5-25 mg, Rectal, Q4HRS PRN, Miguelina Saleh M.D.    prochlorperazine (Compazine) injection 5-10 mg, 5-10 mg, Intravenous, Q4HRS PRN, Miguelina Saleh M.D.    aspirin (Asa) tablet 325 mg, 325 mg, Oral, Once **OR** aspirin (Asa) chewable tab 324 mg, 324 mg, Oral, Once **OR** aspirin (Asa) suppository 300 mg, 300 mg, Rectal, Once, Miguelina Saleh M.D.    heparin infusion 25,000 units in 500 mL 0.45% NACL, 0-30 Units/kg/hr, Intravenous, Continuous, Tye Elmore M.D., Last Rate: 19.1 mL/hr at 05/24/24 1019, 12 Units/kg/hr at 05/24/24 1019    heparin injection 2,000 Units, 2,000 Units, Intravenous, PRN, Tye Elmore M.D.    metoprolol SR (Toprol XL) tablet 25 mg, 25 mg, Oral, Q DAY, Jeevan Moss M.D.    Current Outpatient Medications:  Medications Prior to Admission   Medication Sig Dispense Refill Last Dose    aspirin 81 MG EC tablet Take 1 Tablet by mouth every day. 30 Tablet 11 5/23/2024 at AM    losartan (COZAAR) 25 MG Tab Take 1 Tablet by mouth every evening. 30 Tablet 11 5/23/2024 at AM    nitroglycerin (NITROSTAT) 0.4 MG SL Tab Place 1 Tablet under the tongue as needed for Chest Pain (up to 3 doses (if SBP greater than 90 mmHg)). 25 Tablet 2 5/24/2024 at 0330       Medication Allergy:  No Known Allergies    Family History:  History reviewed. No pertinent family history.    Social History:  Social History     Socioeconomic History    Marital status: Single     Spouse name: Not on file    Number of children: Not on file    Years of education: Not on file    Highest education level: Not on file   Occupational History    Not on file   Tobacco Use    Smoking status: Every Day     Current packs/day: 0.50     Types: Cigarettes    Smokeless tobacco: Never   Vaping Use    Vaping status: Never Used   Substance and Sexual Activity    Alcohol use: Yes     Alcohol/week: 1.2 oz     Types: 2 Cans of beer per week     Comment: occ  "   Drug use: Yes     Types: Inhaled     Comment: marijuana    Sexual activity: Not on file   Other Topics Concern    Not on file   Social History Narrative    Not on file     Social Determinants of Health     Financial Resource Strain: Not on file   Food Insecurity: Not on file   Transportation Needs: Not on file   Physical Activity: Not on file   Stress: Not on file   Social Connections: Not on file   Intimate Partner Violence: Not At Risk (5/24/2024)    Humiliation, Afraid, Rape, and Kick questionnaire     Fear of Current or Ex-Partner: No     Emotionally Abused: No     Physically Abused: No     Sexually Abused: No   Housing Stability: Not on file         Physical Exam:  Vitals/ General Appearance:   Weight/BMI: Body mass index is 27.66 kg/m².  BP (!) 175/103   Pulse 64   Temp 36.4 °C (97.5 °F) (Temporal)   Resp 18   Ht 1.702 m (5' 7\")   Wt 80.1 kg (176 lb 9.4 oz)   SpO2 97%   Vitals:    05/24/24 1200 05/24/24 1232 05/24/24 1235 05/24/24 1250   BP: (!) 87/48 (!) 158/95 (!) 175/103    Pulse: (!) 58 68 64    Resp: 14 19 18    Temp:   36.4 °C (97.5 °F)    TempSrc:   Temporal    SpO2: 93% 96% 97%    Weight:   80.1 kg (176 lb 9.4 oz) 80.1 kg (176 lb 9.4 oz)   Height:         Oxygen Therapy:  Pulse Oximetry: 97 %, O2 (LPM): 0, O2 Delivery Device: None - Room Air    Constitutional:   Well developed, Well nourished, No acute distress  HENMT:  Normocephalic, Atraumatic, Oropharynx moist mucous membranes, No oral exudates, Nose normal.  No thyromegaly.  Eyes:  EOMI, Conjunctiva normal, No discharge.  Neck:  Normal range of motion, No cervical tenderness,  no JVD.  Cardiovascular:  Normal heart rate, Normal rhythm, No murmurs, No rubs, No gallops.   Extremitites with intact distal pulses, no cyanosis, or edema.  Lungs:  Normal breath sounds, breath sounds clear to auscultation bilaterally,  no crackles, no wheezing.   Abdomen: Bowel sounds normal, Soft, No tenderness, No guarding, No rebound, No masses, No " hepatosplenomegaly.  Skin: Warm, Dry, No erythema, No rash, no induration.  Neurologic: Alert & oriented x 3, No focal deficits noted, cranial nerves II through X are grossly intact.  Psychiatric: Affect normal, Judgment normal, Mood normal.      MDM (Data Review):     Records reviewed and summarized in current documentation    Lab Data Review:  Recent Results (from the past 24 hour(s))   EKG    Collection Time: 24  7:24 AM   Result Value Ref Range    Report       [facility]    Test Date:  2024  Pt Name:    ART GARCIA                  Department: ER  MRN:        [id number]                      Room:       [facility]  Gender:     Male                         Technician: [id number]  :        1965                   Requested By:ER TRIAGE PROTOCOL  Order #:    [id number]                    Reading MD: Ramírez Gamble MD    Measurements  Intervals                                Axis  Rate:       66                           P:          46  NC:         131                          QRS:        4  QRSD:       98                           T:          76  QT:         370  QTc:        388    Interpretive Statements  EKG is normal sinus rhythm, normal axis, normal intervals, no significant ST  elevation consistent with acute regional ischemia, largely unchanged from EKG  2022  Electronically Signed On 2024 07:44:49 PDT by Ramírez Gamble MD     CBC WITH DIFFERENTIAL    Collection Time: 24  7:44 AM   Result Value Ref Range    WBC 8.3 4.8 - 10.8 K/uL    RBC 5.72 4.70 - 6.10 M/uL    Hemoglobin 16.4 14.0 - 18.0 g/dL    Hematocrit 48.2 42.0 - 52.0 %    MCV 84.3 81.4 - 97.8 fL    MCH 28.7 27.0 - 33.0 pg    MCHC 34.0 32.3 - 36.5 g/dL    RDW 41.6 35.9 - 50.0 fL    Platelet Count 209 164 - 446 K/uL    MPV 10.6 9.0 - 12.9 fL    Neutrophils-Polys 74.00 (H) 44.00 - 72.00 %    Lymphocytes 18.40 (L) 22.00 - 41.00 %    Monocytes 5.50 0.00 - 13.40 %    Eosinophils 1.20 0.00 - 6.90 %     Basophils 0.20 0.00 - 1.80 %    Immature Granulocytes 0.70 0.00 - 0.90 %    Nucleated RBC 0.00 0.00 - 0.20 /100 WBC    Neutrophils (Absolute) 6.15 1.82 - 7.42 K/uL    Lymphs (Absolute) 1.53 1.00 - 4.80 K/uL    Monos (Absolute) 0.46 0.00 - 0.85 K/uL    Eos (Absolute) 0.10 0.00 - 0.51 K/uL    Baso (Absolute) 0.02 0.00 - 0.12 K/uL    Immature Granulocytes (abs) 0.06 0.00 - 0.11 K/uL    NRBC (Absolute) 0.00 K/uL   CMP    Collection Time: 05/24/24  7:44 AM   Result Value Ref Range    Sodium 138 135 - 145 mmol/L    Potassium 4.4 3.6 - 5.5 mmol/L    Chloride 105 96 - 112 mmol/L    Co2 23 20 - 33 mmol/L    Anion Gap 10.0 7.0 - 16.0    Glucose 101 (H) 65 - 99 mg/dL    Bun 16 8 - 22 mg/dL    Creatinine 0.78 0.50 - 1.40 mg/dL    Calcium 8.9 8.5 - 10.5 mg/dL    Correct Calcium 8.9 8.5 - 10.5 mg/dL    AST(SGOT) 34 12 - 45 U/L    ALT(SGPT) 32 2 - 50 U/L    Alkaline Phosphatase 98 30 - 99 U/L    Total Bilirubin 0.7 0.1 - 1.5 mg/dL    Albumin 4.0 3.2 - 4.9 g/dL    Total Protein 6.9 6.0 - 8.2 g/dL    Globulin 2.9 1.9 - 3.5 g/dL    A-G Ratio 1.4 g/dL   LIPASE    Collection Time: 05/24/24  7:44 AM   Result Value Ref Range    Lipase 26 11 - 82 U/L   TROPONIN    Collection Time: 05/24/24  7:44 AM   Result Value Ref Range    Troponin T 110 (H) 6 - 19 ng/L   ESTIMATED GFR    Collection Time: 05/24/24  7:44 AM   Result Value Ref Range    GFR (CKD-EPI) 104 >60 mL/min/1.73 m 2   URINE DRUG SCREEN    Collection Time: 05/24/24  8:55 AM   Result Value Ref Range    Amphetamines Urine Negative Negative    Barbiturates Negative Negative    Benzodiazepines Negative Negative    Cocaine Metabolite Negative Negative    Fentanyl, Urine Negative Negative    Methadone Negative Negative    Opiates Negative Negative    Oxycodone Negative Negative    Phencyclidine -Pcp Negative Negative    Propoxyphene Negative Negative    Cannabinoid Metab Negative Negative   TROPONIN    Collection Time: 05/24/24  9:46 AM   Result Value Ref Range    Troponin T 315 (H) 6 -  19 ng/L   aPTT    Collection Time: 24  9:46 AM   Result Value Ref Range    APTT 28.1 24.7 - 36.0 sec   Prothrombin Time    Collection Time: 24  9:46 AM   Result Value Ref Range    PT 12.9 12.0 - 14.6 sec    INR 0.96 0.87 - 1.13   Heparin Xa (Unfractionated)    Collection Time: 24  9:46 AM   Result Value Ref Range    Heparin Xa (UFH) <0.10 IU/mL   TROPONIN    Collection Time: 24  1:43 PM   Result Value Ref Range    Troponin T 812 (H) 6 - 19 ng/L   EKG    Collection Time: 24  1:58 PM   Result Value Ref Range    Report       [facility]    Test Date:  2024  Pt Name:    ART GARCIA                  Department: 171  MRN:        [id number]                      Room:       [facility]  Gender:     Male                         Technician: SCOTT  :        1965                   Requested By:ABIMBOLA PEREIRA  Order #:    [id number]                    Reading MD: Christopher Andrade MD    Measurements  Intervals                                Axis  Rate:       59                           P:          64  NH:         130                          QRS:        17  QRSD:       104                          T:          32  QT:         455  QTc:        451    Interpretive Statements  Sinus bradycardia  Low voltage, extremity leads  Compared to ECG 2024 07:24:59  No significant changes  Electronically Signed On 2024 14:04:56 PDT by Christopher Andrade MD         Imaging/Procedures Review:    Chest Xray:  Reviewed    EKG:   As in HPI.     ECHO:  No     MDM (Assessment and Plan):     Active Hospital Problems    Diagnosis     Atypical chest pain [R07.89]     NSTEMI (non-ST elevated myocardial infarction) ([facility]) [I21.4]      NSTEMI  High risk patient, history of 2 stent placements, not compliant to the medication (not taking statin), chest pain with radiation to bilateral arms and jaw, troponin Elevation, peak value 812, still smoke.  -Cardiac cath today  -N.p.o.  -Continue heparin drip  -Continue metoprolol,  aspirin and Crestor  -Repeat Echo   -Repeat lipid profile and HbA1c

## 2024-05-24 NOTE — ED NOTES
Received call from lab for critical troponin. Dr. Elmore at bedside and was made aware of lab value.

## 2024-05-25 ENCOUNTER — APPOINTMENT (OUTPATIENT)
Dept: CARDIOLOGY | Facility: MEDICAL CENTER | Age: 59
DRG: 322 | End: 2024-05-25
Attending: INTERNAL MEDICINE
Payer: OTHER MISCELLANEOUS

## 2024-05-25 ENCOUNTER — PHARMACY VISIT (OUTPATIENT)
Dept: PHARMACY | Facility: MEDICAL CENTER | Age: 59
End: 2024-05-25
Payer: COMMERCIAL

## 2024-05-25 VITALS
TEMPERATURE: 97.9 F | DIASTOLIC BLOOD PRESSURE: 98 MMHG | BODY MASS INDEX: 28.27 KG/M2 | HEIGHT: 67 IN | WEIGHT: 180.12 LBS | RESPIRATION RATE: 18 BRPM | SYSTOLIC BLOOD PRESSURE: 150 MMHG | HEART RATE: 77 BPM | OXYGEN SATURATION: 96 %

## 2024-05-25 LAB
ANION GAP SERPL CALC-SCNC: 11 MMOL/L (ref 7–16)
BUN SERPL-MCNC: 16 MG/DL (ref 8–22)
CALCIUM SERPL-MCNC: 8.5 MG/DL (ref 8.5–10.5)
CHLORIDE SERPL-SCNC: 106 MMOL/L (ref 96–112)
CHOLEST SERPL-MCNC: 171 MG/DL (ref 100–199)
CO2 SERPL-SCNC: 20 MMOL/L (ref 20–33)
CREAT SERPL-MCNC: 0.7 MG/DL (ref 0.5–1.4)
ERYTHROCYTE [DISTWIDTH] IN BLOOD BY AUTOMATED COUNT: 42.1 FL (ref 35.9–50)
EST. AVERAGE GLUCOSE BLD GHB EST-MCNC: 123 MG/DL
GFR SERPLBLD CREATININE-BSD FMLA CKD-EPI: 108 ML/MIN/1.73 M 2
GLUCOSE SERPL-MCNC: 97 MG/DL (ref 65–99)
HBA1C MFR BLD: 5.9 % (ref 4–5.6)
HCT VFR BLD AUTO: 44.8 % (ref 42–52)
HDLC SERPL-MCNC: 29 MG/DL
HGB BLD-MCNC: 15.7 G/DL (ref 14–18)
LDLC SERPL CALC-MCNC: 88 MG/DL
LV EJECT FRACT  99904: 45
LV EJECT FRACT MOD 2C 99903: 45.75
LV EJECT FRACT MOD 4C 99902: 48.42
LV EJECT FRACT MOD BP 99901: 46.12
MCH RBC QN AUTO: 29.2 PG (ref 27–33)
MCHC RBC AUTO-ENTMCNC: 35 G/DL (ref 32.3–36.5)
MCV RBC AUTO: 83.4 FL (ref 81.4–97.8)
PLATELET # BLD AUTO: 178 K/UL (ref 164–446)
PMV BLD AUTO: 10.6 FL (ref 9–12.9)
POTASSIUM SERPL-SCNC: 3.9 MMOL/L (ref 3.6–5.5)
RBC # BLD AUTO: 5.37 M/UL (ref 4.7–6.1)
SODIUM SERPL-SCNC: 137 MMOL/L (ref 135–145)
TRIGL SERPL-MCNC: 268 MG/DL (ref 0–149)
WBC # BLD AUTO: 8.6 K/UL (ref 4.8–10.8)

## 2024-05-25 PROCEDURE — 700117 HCHG RX CONTRAST REV CODE 255: Performed by: STUDENT IN AN ORGANIZED HEALTH CARE EDUCATION/TRAINING PROGRAM

## 2024-05-25 PROCEDURE — 700102 HCHG RX REV CODE 250 W/ 637 OVERRIDE(OP): Performed by: HOSPITALIST

## 2024-05-25 PROCEDURE — A9270 NON-COVERED ITEM OR SERVICE: HCPCS | Performed by: INTERNAL MEDICINE

## 2024-05-25 PROCEDURE — 700102 HCHG RX REV CODE 250 W/ 637 OVERRIDE(OP): Performed by: INTERNAL MEDICINE

## 2024-05-25 PROCEDURE — 83036 HEMOGLOBIN GLYCOSYLATED A1C: CPT

## 2024-05-25 PROCEDURE — RXMED WILLOW AMBULATORY MEDICATION CHARGE: Performed by: STUDENT IN AN ORGANIZED HEALTH CARE EDUCATION/TRAINING PROGRAM

## 2024-05-25 PROCEDURE — 99239 HOSP IP/OBS DSCHRG MGMT >30: CPT | Performed by: STUDENT IN AN ORGANIZED HEALTH CARE EDUCATION/TRAINING PROGRAM

## 2024-05-25 PROCEDURE — 700102 HCHG RX REV CODE 250 W/ 637 OVERRIDE(OP)

## 2024-05-25 PROCEDURE — 97162 PT EVAL MOD COMPLEX 30 MIN: CPT

## 2024-05-25 PROCEDURE — 80061 LIPID PANEL: CPT

## 2024-05-25 PROCEDURE — 93306 TTE W/DOPPLER COMPLETE: CPT | Mod: 26 | Performed by: INTERNAL MEDICINE

## 2024-05-25 PROCEDURE — A9270 NON-COVERED ITEM OR SERVICE: HCPCS | Performed by: HOSPITALIST

## 2024-05-25 PROCEDURE — 80048 BASIC METABOLIC PNL TOTAL CA: CPT

## 2024-05-25 PROCEDURE — 85027 COMPLETE CBC AUTOMATED: CPT

## 2024-05-25 PROCEDURE — 93306 TTE W/DOPPLER COMPLETE: CPT

## 2024-05-25 PROCEDURE — 97535 SELF CARE MNGMENT TRAINING: CPT

## 2024-05-25 PROCEDURE — A9270 NON-COVERED ITEM OR SERVICE: HCPCS

## 2024-05-25 RX ORDER — METOPROLOL SUCCINATE 25 MG/1
25 TABLET, EXTENDED RELEASE ORAL DAILY
Qty: 30 TABLET | Refills: 0 | Status: SHIPPED | OUTPATIENT
Start: 2024-05-26

## 2024-05-25 RX ORDER — PRASUGREL 10 MG/1
10 TABLET, FILM COATED ORAL DAILY
Qty: 30 TABLET | Refills: 0 | Status: SHIPPED | OUTPATIENT
Start: 2024-05-26

## 2024-05-25 RX ORDER — ROSUVASTATIN CALCIUM 20 MG/1
20 TABLET, COATED ORAL EVERY EVENING
Qty: 30 TABLET | Refills: 11 | Status: SHIPPED | OUTPATIENT
Start: 2024-05-25

## 2024-05-25 RX ADMIN — SENNOSIDES AND DOCUSATE SODIUM 2 TABLET: 50; 8.6 TABLET ORAL at 05:06

## 2024-05-25 RX ADMIN — Medication 5 MG: at 01:07

## 2024-05-25 RX ADMIN — HUMAN ALBUMIN MICROSPHERES AND PERFLUTREN 3 ML: 10; .22 INJECTION, SOLUTION INTRAVENOUS at 11:30

## 2024-05-25 RX ADMIN — ASPIRIN 81 MG: 81 TABLET, COATED ORAL at 05:06

## 2024-05-25 RX ADMIN — PRASUGREL 10 MG: 10 TABLET, FILM COATED ORAL at 05:06

## 2024-05-25 ASSESSMENT — COGNITIVE AND FUNCTIONAL STATUS - GENERAL
MOBILITY SCORE: 24
SUGGESTED CMS G CODE MODIFIER MOBILITY: CH

## 2024-05-25 ASSESSMENT — PAIN DESCRIPTION - PAIN TYPE: TYPE: ACUTE PAIN

## 2024-05-25 ASSESSMENT — GAIT ASSESSMENTS
GAIT LEVEL OF ASSIST: INDEPENDENT
DEVIATION: NO DEVIATION
DISTANCE (FEET): 500

## 2024-05-25 ASSESSMENT — FIBROSIS 4 INDEX: FIB4 SCORE: 1.89

## 2024-05-25 NOTE — ASSESSMENT & PLAN NOTE
-Patient states he is trying to cut back on smoking.  Encouraged him to continue tobacco smoking cessation efforts.

## 2024-05-25 NOTE — PROGRESS NOTES
Patient walked to d/c [facility] with d/c RN. IV removed. Tele box removed, monitor room notified.

## 2024-05-25 NOTE — CARE PLAN
The patient is Stable - Low risk of patient condition declining or worsening         Progress made toward(s) clinical / shift goals:      Problem: Knowledge Deficit - Standard  Goal: Patient and family/care givers will demonstrate understanding of plan of care, disease process/condition, diagnostic tests and medications  Outcome: Progressing       Patient A&Ox4. Patient got left heart cath today with one stent placed. Patient denies any chest pain. Patient educated on limiting wrist movement for 48 hours after procedure.

## 2024-05-25 NOTE — DISCHARGE SUMMARY
"Discharge Summary    CHIEF COMPLAINT ON ADMISSION  Chief Complaint   Patient presents with    Chest Pain     CP radiating to right shoulder and jaw with lightheadedness starting at 0330 this AM. Pt given 2 nitro at home and ASA and nitro given en route by care flight.        Reason for Admission  other     Admission Date  5/24/2024    CODE STATUS  Full Code    HPI & HOSPITAL COURSE  58 y.o. male h/o CAD s/p PCI left circumflex for STEMI in 2015 and PCI to posterior branch of RCA in 2022, who is not compliant with his Plavix and  presented to the emergency department on 5/24/2024 with chest pain.  Symptoms started last night, waking him up around 1 AM.  Patient reports he had a pain described as \"squeezing like sharp \"pain mostly in the middle of the chest with radiation to his jaw.  Patient took 325 mg of aspirin and 2 nitroglycerin with full resolution of his pain but decided to come to the ER for further evaluation.     Patient was admitted to the telemetry floor.  Cardiology following for which patient underwent left heart catheterization with left circumflex PCI with drug-eluting stent deployment.  He was started on dual antiplatelet therapy with aspirin and Cipro which she is to continue the outpatient setting.  Follow-up echocardiogram with ejection fraction 45%, global hypokinesis and grade 1 diastolic dysfunction.  Patient euvolemic and saturating well room air.  Stable patient with in chronic condition is to be discharged home on metoprolol, rosuvastatin, losartan, aspirin, prasugrel and was instructed to follow-up with his primary care provider and cardiology in the outpatient setting.    All results and plan of action discussed with the patient for she voiced understanding agreement with the primary care team.  Patient was instructed to return to emergency department symptoms were to worsen.    Therefore, he is discharged in good and stable condition to home with close outpatient follow-up.    The patient " recovered much more quickly than anticipated on admission.    Discharge Date  5/25/2024    FOLLOW UP ITEMS POST DISCHARGE  Primary care provider follow posthospital discharge care    DISCHARGE DIAGNOSES  Principal Problem:    NSTEMI (non-ST elevated myocardial infarction) (HCC) (POA: Yes)  Active Problems:    Dyslipidemia (POA: Yes)    Tobacco abuse (POA: Yes)    Alcohol abuse (POA: Yes)    Noncompliance (POA: Yes)  Resolved Problems:    * No resolved hospital problems. *      FOLLOW UP  No future appointments.  No follow-up provider specified.    MEDICATIONS ON DISCHARGE     Medication List        START taking these medications        Instructions   metoprolol SR 25 MG Tb24  Start taking on: May 26, 2024  Commonly known as: Toprol XL   Take 1 Tablet by mouth every day.  Dose: 25 mg     prasugrel 10 MG Tabs  Start taking on: May 26, 2024  Commonly known as: Effient   Take 1 Tablet by mouth every day.  Dose: 10 mg            CONTINUE taking these medications        Instructions   Aspirin Low Dose 81 MG EC tablet  Generic drug: aspirin   Take 1 Tablet by mouth every day.  Dose: 81 mg     losartan 25 MG Tabs  Commonly known as: Cozaar   Take 1 Tablet by mouth every evening.  Dose: 25 mg     nitroglycerin 0.4 MG Subl  Commonly known as: Nitrostat   Place 1 Tablet under the tongue as needed for Chest Pain (up to 3 doses (if SBP greater than 90 mmHg)).  Dose: 0.4 mg     rosuvastatin 20 MG Tabs  Commonly known as: Crestor   Take 1 Tablet by mouth every evening.  Dose: 20 mg              Allergies  No Known Allergies    DIET  Orders Placed This Encounter   Procedures    Diet Order Diet: Cardiac     Standing Status:   Standing     Number of Occurrences:   1     Order Specific Question:   Diet:     Answer:   Cardiac [6]       ACTIVITY  As tolerated.  Weight bearing as tolerated    CONSULTATIONS  Cardiology  Interventional cardiology    PROCEDURES  Heart catheterization    LABORATORY  Lab Results   Component Value Date     SODIUM 137 05/25/2024    POTASSIUM 3.9 05/25/2024    CHLORIDE 106 05/25/2024    CO2 20 05/25/2024    GLUCOSE 97 05/25/2024    BUN 16 05/25/2024    CREATININE 0.70 05/25/2024        Lab Results   Component Value Date    WBC 8.6 05/25/2024    HEMOGLOBIN 15.7 05/25/2024    HEMATOCRIT 44.8 05/25/2024    PLATELETCT 178 05/25/2024        Total time of the discharge process exceeds 32 minutes.

## 2024-05-25 NOTE — CARE PLAN
The patient is Stable - Low risk of patient condition declining or worsening    Shift Goals  Clinical Goals: vitals wnl    Progress made toward(s) clinical / shift goals:    Problem: Knowledge Deficit - Standard  Goal: Patient and family/care givers will demonstrate understanding of plan of care, disease process/condition, diagnostic tests and medications  Outcome: Progressing     Problem: Acute Care of the Cardiac Cath Patient  Goal: Post Procedure Optimal Outcome for the Cardiac Cath Patient  Outcome: Progressing       Patient is not progressing towards the following goals:

## 2024-05-25 NOTE — ASSESSMENT & PLAN NOTE
-Patient is not compliant with beta-blockers and Plavix as he did mention it gives him too much side effects.  -I discussed at length, need to continue to take his medications to avoid further coronary complications.

## 2024-05-25 NOTE — THERAPY
"Physical Therapy   Initial Evaluation     Patient Name: Joe Corrales  Age:  58 y.o., Sex:  male  Medical Record #: [id number]  Today's Date: 5/25/2024     Precautions  Precautions: Cardiac Precautions (See Comments)  Comments: Successful IVUS guided PCI culprit LCx/OM    Assessment  Patient is 58 y.o. male with chest pain during sleep and was woke up by the pain early this morning, the pain is diffuse in the chest and ribs radiate to the bilateral arms and jaws, squeezing feeling, constant, nothing can make it better or worse.  Dx'd with NSTEMI, hypertensive emergency  Successful IVUS guided PCI culprit LCx/OM  LVEF 55-60%  Hx of CAD with stent.    Pt doing well with mobility, no SOB, no s/s distress. Pt asked about snowboarding on Monday, recommended pt not snowboard due to exertion.   Pt given \"Physical Activity After Acute Cardiac Injury\" handout. Education included:  Home Walking program with exercise to begin with 5-10 minutes.   Progression of home walking program  Progression of aerobic tolerance and how to self monitor including the Talk Test and RPE scale  Outpatient Cardiac Rehab Phase 2  Signs and symptoms when to call 911  Pt receptive to education.     No further PT needs. Recommend cardiac rehab phase II, especially since this is his second stent.       Plan    Physical Therapy Initial Treatment Plan   Duration: Evaluation only    DC Equipment Recommendations: None  Discharge Recommendations: Anticipate that the patient will have no further physical therapy needs after discharge from the hospital       Subjective    Pt agreed to PT.      Objective       05/25/24 0857   Charge Group   Initial Contact Note    Initial Contact Note Order Received and Verified, Evaluation Only - Patient Does Not Require Further Acute Physical Therapy at this Time.  However, May Benefit from Post Acute Therapy for Higher Level Functional Deficits.   Precautions   Precautions Cardiac Precautions (See Comments)   Comments " Successful IVUS guided PCI culprit LCx/OM   Vitals   Pulse 77  (with amb)   Patient BP Position Sitting   Blood Pressure (!) 150/98  (after amb, notified RN)   O2 Delivery Device None - Room Air   Vitals Comments no S/s distress, RPE 6-7. negative talk test.   Pain 0 - 10 Group   Therapist Pain Assessment 0   Prior Living Situation   Prior Services Home-Independent   Housing / Facility 1 Story House   Lives with - Patient's Self Care Capacity Alone and Able to Care For Self   Comments caretaker for a property   Prior Level of Functional Mobility   Transfer Status Independent   Ambulation Independent   Ambulation Distance community   Assistive Devices Used None   Comments snowboarder   Cognition    Cognition / Consciousness WDL   Level of Consciousness Alert   Comments pleasant, cooperative, receptive to education   Active ROM Lower Body    Active ROM Lower Body  WDL   Strength Lower Body   Lower Body Strength  WDL   Coordination Lower Body    Coordination Lower Body  WDL   Balance Assessment   Sitting Balance (Static) Normal   Sitting Balance (Dynamic) Normal   Standing Balance (Static) Normal   Standing Balance (Dynamic) Normal   Weight Shift Sitting Normal   Weight Shift Standing Normal   Bed Mobility    Scooting Independent   Rolling Independent   Gait Analysis   Gait Level Of Assist Independent   Assistive Device None   Distance (Feet) 500   # of Times Distance was Traveled 1   Deviation No deviation   Functional Mobility   Sit to Stand Independent   Bed, Chair, Wheelchair Transfer Independent   Toilet Transfers Independent   6 Clicks Assessment - How much HELP from from another person do you currently need... (If the patient hasn't done an activity recently, how much help from another person do you think he/she would need if he/she tried?)   Turning from your back to your side while in a flat bed without using bedrails? 4   Moving from lying on your back to sitting on the side of a flat bed without using  bedrails? 4   Moving to and from a bed to a chair (including a wheelchair)? 4   Standing up from a chair using your arms (e.g., wheelchair, or bedside chair)? 4   Walking in hospital room? 4   Climbing 3-5 steps with a railing? 4   6 clicks Mobility Score 24   Activity Tolerance   Comments no limitations with functional mobility   Education Group   Education Provided Cardiac Precautions;Role of Physical Therapist   Cardiac Precautions Patient Response Patient;Acceptance;Explanation;Handout;Action Demonstration;Verbal Demonstration   Role of Physical Therapist Patient Response Patient;Acceptance;Explanation;Verbal Demonstration   Physical Therapy Initial Treatment Plan    Duration Evaluation only   Anticipated Discharge Equipment and Recommendations   DC Equipment Recommendations None   Discharge Recommendations Anticipate that the patient will have no further physical therapy needs after discharge from the hospital   Interdisciplinary Plan of Care Collaboration   IDT Collaboration with  Nursing   Patient Position at End of Therapy Seated;Tray Table within Reach;Call Light within Reach   Collaboration Comments RN updated

## 2024-05-25 NOTE — PROGRESS NOTES
Patient is being discharged home from the discharge lounge. Patient is A&Ox4. IV removed. Discharge instructions provided to patient and reviewed. Patient verbalized understanding and all questions and concerns were addressed. Meds to beds delivered.

## 2024-05-25 NOTE — PROGRESS NOTES
Bedside report received from night RN; assumed pt care. Pt assessment complete. Pt A&Ox4. Reviewed plan of care with pt. Pt on tele. On room air. Chart and labs reviewed. Bed in lowest position, and 2 side rails up. Pt educated on call light; call light with in reach.

## 2024-05-25 NOTE — ASSESSMENT & PLAN NOTE
-Inpatient status to telemetry floor.  -Patient with significant cardiac history, 2 previous stents and noncompliant with Plavix.  -Trending up troponin 110 > 315  -Pain improved with nitroglycerin.  -Will keep patient n.p.o., he will likely need cardiac Cath  -Patient will need heparin infusion therapy which was started.  This will need to closely monitoring, if troponin is trending up, having acute chest pain or EKG changes, he will need urgent cardiac catheterization.  -ERP discussed the case with cardiologist.  I appreciate consult and recommendations.  -Serial cardiac enzymes added

## 2024-05-25 NOTE — PROGRESS NOTES
Monitor Summary  Rhythm: SR  Rate: 60-82  Ectopy: pvc  Measurements: .17/.08/.33  ---12 hr Chart Review---

## 2024-05-28 ENCOUNTER — PATIENT OUTREACH (OUTPATIENT)
Dept: SCHEDULING | Facility: IMAGING CENTER | Age: 59
End: 2024-05-28
Payer: OTHER MISCELLANEOUS

## 2024-06-11 ENCOUNTER — TELEPHONE (OUTPATIENT)
Dept: HEALTH INFORMATION MANAGEMENT | Facility: OTHER | Age: 59
End: 2024-06-11
Payer: OTHER MISCELLANEOUS

## 2024-12-31 ENCOUNTER — TELEPHONE (OUTPATIENT)
Dept: HEALTH INFORMATION MANAGEMENT | Facility: OTHER | Age: 59
End: 2024-12-31
Payer: OTHER MISCELLANEOUS